# Patient Record
Sex: FEMALE | Race: OTHER | HISPANIC OR LATINO
[De-identification: names, ages, dates, MRNs, and addresses within clinical notes are randomized per-mention and may not be internally consistent; named-entity substitution may affect disease eponyms.]

---

## 2018-02-08 ENCOUNTER — FORM ENCOUNTER (OUTPATIENT)
Age: 68
End: 2018-02-08

## 2018-02-09 ENCOUNTER — APPOINTMENT (OUTPATIENT)
Dept: RADIOLOGY | Facility: CLINIC | Age: 68
End: 2018-02-09

## 2018-02-09 ENCOUNTER — APPOINTMENT (OUTPATIENT)
Dept: ORTHOPEDIC SURGERY | Facility: CLINIC | Age: 68
End: 2018-02-09
Payer: MEDICARE

## 2018-02-09 ENCOUNTER — OUTPATIENT (OUTPATIENT)
Dept: OUTPATIENT SERVICES | Facility: HOSPITAL | Age: 68
LOS: 1 days | End: 2018-02-09
Payer: MEDICARE

## 2018-02-09 VITALS — RESPIRATION RATE: 16 BRPM | WEIGHT: 180 LBS | HEIGHT: 60 IN | BODY MASS INDEX: 35.34 KG/M2

## 2018-02-09 DIAGNOSIS — Z86.39 PERSONAL HISTORY OF OTHER ENDOCRINE, NUTRITIONAL AND METABOLIC DISEASE: ICD-10-CM

## 2018-02-09 DIAGNOSIS — M25.562 PAIN IN RIGHT KNEE: ICD-10-CM

## 2018-02-09 DIAGNOSIS — Z87.39 PERSONAL HISTORY OF OTHER DISEASES OF THE MUSCULOSKELETAL SYSTEM AND CONNECTIVE TISSUE: ICD-10-CM

## 2018-02-09 DIAGNOSIS — Z87.898 PERSONAL HISTORY OF OTHER SPECIFIED CONDITIONS: ICD-10-CM

## 2018-02-09 DIAGNOSIS — M25.561 PAIN IN RIGHT KNEE: ICD-10-CM

## 2018-02-09 DIAGNOSIS — Z80.9 FAMILY HISTORY OF MALIGNANT NEOPLASM, UNSPECIFIED: ICD-10-CM

## 2018-02-09 PROCEDURE — 20611 DRAIN/INJ JOINT/BURSA W/US: CPT | Mod: RT

## 2018-02-09 PROCEDURE — 73564 X-RAY EXAM KNEE 4 OR MORE: CPT | Mod: 26,50

## 2018-02-09 PROCEDURE — 73564 X-RAY EXAM KNEE 4 OR MORE: CPT

## 2018-02-09 PROCEDURE — 73560 X-RAY EXAM OF KNEE 1 OR 2: CPT | Mod: 26,50

## 2018-02-09 PROCEDURE — 73564 X-RAY EXAM KNEE 4 OR MORE: CPT | Mod: 50

## 2018-02-09 PROCEDURE — 99204 OFFICE O/P NEW MOD 45 MIN: CPT | Mod: 25

## 2018-02-09 PROCEDURE — 73560 X-RAY EXAM OF KNEE 1 OR 2: CPT

## 2018-02-16 ENCOUNTER — APPOINTMENT (OUTPATIENT)
Dept: ORTHOPEDIC SURGERY | Facility: CLINIC | Age: 68
End: 2018-02-16

## 2018-02-16 ENCOUNTER — APPOINTMENT (OUTPATIENT)
Dept: ORTHOPEDIC SURGERY | Facility: CLINIC | Age: 68
End: 2018-02-16
Payer: MEDICARE

## 2018-02-16 PROCEDURE — 20610 DRAIN/INJ JOINT/BURSA W/O US: CPT | Mod: LT

## 2018-02-16 PROCEDURE — 99214 OFFICE O/P EST MOD 30 MIN: CPT | Mod: 25

## 2018-02-19 ENCOUNTER — TRANSCRIPTION ENCOUNTER (OUTPATIENT)
Age: 68
End: 2018-02-19

## 2021-11-22 ENCOUNTER — OUTPATIENT (OUTPATIENT)
Dept: OUTPATIENT SERVICES | Facility: HOSPITAL | Age: 71
LOS: 1 days | End: 2021-11-22
Payer: MEDICARE

## 2021-11-22 ENCOUNTER — RESULT REVIEW (OUTPATIENT)
Age: 71
End: 2021-11-22

## 2021-11-22 ENCOUNTER — APPOINTMENT (OUTPATIENT)
Dept: ORTHOPEDIC SURGERY | Facility: CLINIC | Age: 71
End: 2021-11-22
Payer: MEDICARE

## 2021-11-22 VITALS — WEIGHT: 180 LBS | HEIGHT: 60 IN | RESPIRATION RATE: 16 BRPM | BODY MASS INDEX: 35.34 KG/M2

## 2021-11-22 PROCEDURE — 73564 X-RAY EXAM KNEE 4 OR MORE: CPT | Mod: 26,LT,RT

## 2021-11-22 PROCEDURE — 99203 OFFICE O/P NEW LOW 30 MIN: CPT

## 2021-11-22 PROCEDURE — 73564 X-RAY EXAM KNEE 4 OR MORE: CPT

## 2021-12-08 ENCOUNTER — APPOINTMENT (OUTPATIENT)
Dept: ORTHOPEDIC SURGERY | Facility: CLINIC | Age: 71
End: 2021-12-08
Payer: MEDICARE

## 2021-12-08 VITALS — DIASTOLIC BLOOD PRESSURE: 76 MMHG | HEART RATE: 84 BPM | SYSTOLIC BLOOD PRESSURE: 134 MMHG

## 2021-12-08 VITALS — HEIGHT: 60 IN | WEIGHT: 180 LBS | BODY MASS INDEX: 35.34 KG/M2

## 2021-12-08 PROCEDURE — 99214 OFFICE O/P EST MOD 30 MIN: CPT

## 2021-12-08 NOTE — DISCUSSION/SUMMARY
[de-identified] : 72y/o female with bilateral knee osteoarthritis, left consistently more symptomatic than right\par - She is indicated for staged bilateral total knee replacement starting with the left side first\par - We discussed the details of the procedure, the expected recovery period, and the expected outcome. We discussed the likelihood of satisfaction after complete recovery, and the potential causes of dissatisfaction. The importance of active patient participation in the rehabilitation protocol was emphasized, along with its influence on short and long-term outcomes. Specific risks of total knee replacement were discussed in detail. We discussed the risk of surgical site complications including but not limited to: surgical site infection, wound healing complications, bone fracture, tendon or ligament injury, neurovascular injury, hemorrhage, postoperative stiffness or instability, persistent pain and need for reoperation or manipulation under anesthesia. We discussed surgical blood loss and the possible need for blood transfusion. We discussed the risk of perioperative medical complications, including but not limited to catheter-associated urinary tract infection, venous thromboembolism and other cardiopulmonary complications. We discussed anesthetic options and the risk of anesthesia-related complications. We discussed implant fixation methods; my plan would be to use fully cemented fixation in this case. We discussed the variable need to resurface the patella; my plan would be to leave unresurfaced in this case. We discussed the durability of prosthetic knees and limitations related to wear, osteolysis and loosening.  We discussed the role of the robot in helping to guide bone resections and measure alignment and soft tissue balance. All questions were answered the patient's satisfaction. The patient was given a copy of my preoperative packet with additional information about the procedure. I asked the patient to either call back or schedule a followup appointment for any additional questions or concerns regarding the procedure.\par - Book for surgery at a convenient time, ASAP per their preference\par - Standard medical clearance preop\par - Discussed with daughter present who also had all questions answered to her satisfaction\par - Primary point of contact should be son Tono

## 2021-12-08 NOTE — PHYSICAL EXAM
[de-identified] : General appearance: well nourished and hydrated, pleasant, alert and oriented x 3, cooperative.  \par HEENT: normocephalic, EOM intact, wearing mask, external auditory canal clear.  \par Cardiovascular: no lower leg edema, no varicosities, dorsalis pedis pulses palpable and symmetric.  \par Lymphatics: no palpable lymphadenopathy, no lymphedema.  \par Neurologic: sensation is normal, no muscle weakness in upper or lower extremities, patella tendon reflexes present and symmetric.  \par Dermatologic: skin moist, warm, no rash.  \par Spine: cervical spine with normal lordosis and painless range of motion, thoracic spine with normal kyphosis and painless range of motion, lumbosacral spine with normal lordosis and painless range of motion. \par Gait: slow to stand and get started. Bilaterally antalgic with bilateral varus thrust.\par \par Left knee:\par - Soft tissue swelling: mild\par - Ecchymosis: none\par - Erythema: none\par - Effusion: small\par - Wounds: none\par - Alignment: partially correctable varus\par - Tenderness: circumferential\par - ROM: 0-125\par - Collateral laxity: none\par - Cruciate laxity: none\par - Popliteal angle (degrees): 30\par - Quad strength: 5/5\par \par Right knee:\par - Soft tissue swelling: mild\par - Ecchymosis: none\par - Erythema: none\par - Effusion: small\par - Wounds: none\par - Alignment: minimally correctable varus\par - Tenderness: circumferential\par - ROM: 0-125\par - Collateral laxity: none\par - Cruciate laxity: none\par - Popliteal angle (degrees): 30\par - Quad strength: 5/5 [de-identified] : 4 views of the bilateral knees (weightbearing AP, weightbearing Dawson, weightbearing lateral, and Sunrise) were interpreted by me and reviewed with the patient.\par \par Location of imaging: [facility]\par Date of exam: 11/22/21\par \par Right knee --\par Alignment: varus with lateral tibial subluxation\par Arthritis: severe tricompartmental worst medial, KL4\par Patellar height: normal\par Patellar tracking: central\par \par Left knee --\par Alignment: varus with lateral tibial subluxation\par Arthritis: severe tricompartmental worst medial, KL4\par Patellar height: normal\par Patellar tracking: central

## 2021-12-08 NOTE — HISTORY OF PRESENT ILLNESS
[9] : a current pain level of 9/10 [Walking] : worsened by walking [Rest] : relieved by rest [de-identified] : 12/8/21: 72y/o Bangladeshi speaking female here with daughter Conchis, ref by Dr. Bunch for evaluation of bilateral knee osteoarthritis. Left is consistently more symptomatic than the right. Progressive pain, swelling, deformity, and loss of function for the past 6 years. Treatments attempted thus far include PT, HEP, activity modification, Tylenol, various NSAIDs, CSI, and PRP. Pain at this point is severe every day and limits her from ambulating any more than necessary for basic necessities. Lives with an adult child; has three steps to manage at home. Can walk about 5-10min with cane. The child does all the IADLs. Here to discuss TKA.\par \par PMH sig only for HLD.  [de-identified] : describes daily pa

## 2022-01-07 ENCOUNTER — NON-APPOINTMENT (OUTPATIENT)
Age: 72
End: 2022-01-07

## 2022-01-27 ENCOUNTER — TRANSCRIPTION ENCOUNTER (OUTPATIENT)
Age: 72
End: 2022-01-27

## 2022-01-27 VITALS
OXYGEN SATURATION: 97 % | SYSTOLIC BLOOD PRESSURE: 127 MMHG | HEIGHT: 58 IN | TEMPERATURE: 97 F | RESPIRATION RATE: 16 BRPM | HEART RATE: 70 BPM | DIASTOLIC BLOOD PRESSURE: 77 MMHG | WEIGHT: 198.64 LBS

## 2022-01-27 RX ORDER — POVIDONE-IODINE 5 %
1 AEROSOL (ML) TOPICAL ONCE
Refills: 0 | Status: COMPLETED | OUTPATIENT
Start: 2022-01-28 | End: 2022-01-28

## 2022-01-27 NOTE — PATIENT PROFILE ADULT - FALL HARM RISK - UNIVERSAL INTERVENTIONS
Bed in lowest position, wheels locked, appropriate side rails in place/Call bell, personal items and telephone in reach/Instruct patient to call for assistance before getting out of bed or chair/Non-slip footwear when patient is out of bed/Ava to call system/Physically safe environment - no spills, clutter or unnecessary equipment/Purposeful Proactive Rounding/Room/bathroom lighting operational, light cord in reach

## 2022-01-27 NOTE — PRE-OP CHECKLIST - PATIENT SENT TO
Losartan 100 mg refill request pending doctors approval.    Last office visit 8/9/18 with Dr Mancuso    Next office visit 9/13/19 with Dr Jefe Rodriguez to establish care     operating room

## 2022-01-27 NOTE — H&P ADULT - HISTORY OF PRESENT ILLNESS
71y of c/o left knee pain x   Presents today for elective LEFT TKR, ROBOTIC ASSISTED.  71F c/o left knee pain x 6 years. Pt denies preceding trauma/injury. She states her left knee pain is localized; she denies numbness/tingling/weakness of bilateral lower extremities. She has been using a cane for ambulation x 2 years. Denies DVT hx; denies CP, SOB, N/V, tactile fevers, calf pain.  Presents today for elective left total knee replacement, robotic assisted     English Translation: Jose Elias, ID: [id number]

## 2022-01-27 NOTE — H&P ADULT - PROBLEM SELECTOR PLAN 1
Admit to Orthopaedic Service.  Presents today for elective LEFT TKR, ROBOTIC ASSISTED.  Pt medically stable and cleared for procedure today by Dr. Jimenez. Admit to Orthopaedic Service.  Presents today for elective left total knee replacement, robotic assisted   Pt medically stable and cleared for procedure today by Dr. Jimenez.

## 2022-01-27 NOTE — H&P ADULT - NSHPPHYSICALEXAM_GEN_ALL_CORE
PE:  Decreased ROM secondary to pain. Rest of PE per medical clearance. MSK: + decreased ROM 2/2 pain, left knee  Skin warm and well perfused, no visible wounds/erythema/ecchymoses  EHL/FHL/TA/GS 5/5 motor strength bilateral lower extremities   SLT in tact to distal bilateral lower extremities   DP pulses palpable bilaterally  Remainder of exam per medical clearance note

## 2022-01-28 ENCOUNTER — APPOINTMENT (OUTPATIENT)
Dept: ORTHOPEDIC SURGERY | Facility: HOSPITAL | Age: 72
End: 2022-01-28

## 2022-01-28 ENCOUNTER — INPATIENT (INPATIENT)
Facility: HOSPITAL | Age: 72
LOS: 2 days | Discharge: HOME HEALTH | DRG: 470 | End: 2022-01-31
Attending: ORTHOPAEDIC SURGERY | Admitting: ORTHOPAEDIC SURGERY
Payer: MEDICARE

## 2022-01-28 ENCOUNTER — TRANSCRIPTION ENCOUNTER (OUTPATIENT)
Age: 72
End: 2022-01-28

## 2022-01-28 DIAGNOSIS — M19.90 UNSPECIFIED OSTEOARTHRITIS, UNSPECIFIED SITE: ICD-10-CM

## 2022-01-28 DIAGNOSIS — Z98.891 HISTORY OF UTERINE SCAR FROM PREVIOUS SURGERY: Chronic | ICD-10-CM

## 2022-01-28 PROCEDURE — 27447 TOTAL KNEE ARTHROPLASTY: CPT | Mod: LT

## 2022-01-28 PROCEDURE — S2900 ROBOTIC SURGICAL SYSTEM: CPT | Mod: NC

## 2022-01-28 PROCEDURE — 73560 X-RAY EXAM OF KNEE 1 OR 2: CPT | Mod: 26,LT

## 2022-01-28 DEVICE — ZIMMER FEMALE HEX SCREW MAGNETIC 2.5MM X 25MM: Type: IMPLANTABLE DEVICE | Status: FUNCTIONAL

## 2022-01-28 DEVICE — IMPLANTABLE DEVICE: Type: IMPLANTABLE DEVICE | Status: FUNCTIONAL

## 2022-01-28 DEVICE — PIN CAS FIX 3.2X150MM: Type: IMPLANTABLE DEVICE | Status: FUNCTIONAL

## 2022-01-28 DEVICE — STEM EXT PERSONA 14MM PLUS 30M: Type: IMPLANTABLE DEVICE | Status: FUNCTIONAL

## 2022-01-28 DEVICE — CEMENT SIMPLEX HV: Type: IMPLANTABLE DEVICE | Status: FUNCTIONAL

## 2022-01-28 DEVICE — PATELLA POLY VE 26MM: Type: IMPLANTABLE DEVICE | Status: FUNCTIONAL

## 2022-01-28 DEVICE — ZIMMER/NEXGEN SMOOTH PIN 3.2X75MM: Type: IMPLANTABLE DEVICE | Status: FUNCTIONAL

## 2022-01-28 DEVICE — STEM PSN TIB CMT SZ CL 5 DEG: Type: IMPLANTABLE DEVICE | Status: FUNCTIONAL

## 2022-01-28 DEVICE — PIN FIX CAS 3.2X80MM STR: Type: IMPLANTABLE DEVICE | Status: FUNCTIONAL

## 2022-01-28 DEVICE — ZIMMER/NEXGEN HEX HEAD SCREW 3.5MM: Type: IMPLANTABLE DEVICE | Status: FUNCTIONAL

## 2022-01-28 RX ORDER — KETOROLAC TROMETHAMINE 30 MG/ML
15 SYRINGE (ML) INJECTION EVERY 6 HOURS
Refills: 0 | Status: DISCONTINUED | OUTPATIENT
Start: 2022-01-28 | End: 2022-01-29

## 2022-01-28 RX ORDER — OXYCODONE HYDROCHLORIDE 5 MG/1
10 TABLET ORAL EVERY 4 HOURS
Refills: 0 | Status: DISCONTINUED | OUTPATIENT
Start: 2022-01-28 | End: 2022-01-31

## 2022-01-28 RX ORDER — HYDROMORPHONE HYDROCHLORIDE 2 MG/ML
0.5 INJECTION INTRAMUSCULAR; INTRAVENOUS; SUBCUTANEOUS
Refills: 0 | Status: DISCONTINUED | OUTPATIENT
Start: 2022-01-28 | End: 2022-01-31

## 2022-01-28 RX ORDER — ASPIRIN/CALCIUM CARB/MAGNESIUM 324 MG
81 TABLET ORAL
Refills: 0 | Status: DISCONTINUED | OUTPATIENT
Start: 2022-01-29 | End: 2022-01-30

## 2022-01-28 RX ORDER — BUPIVACAINE 13.3 MG/ML
20 INJECTION, SUSPENSION, LIPOSOMAL INFILTRATION ONCE
Refills: 0 | Status: DISCONTINUED | OUTPATIENT
Start: 2022-01-28 | End: 2022-01-31

## 2022-01-28 RX ORDER — ACETAMINOPHEN 500 MG
1000 TABLET ORAL ONCE
Refills: 0 | Status: COMPLETED | OUTPATIENT
Start: 2022-01-28 | End: 2022-01-28

## 2022-01-28 RX ORDER — MAGNESIUM HYDROXIDE 400 MG/1
30 TABLET, CHEWABLE ORAL DAILY
Refills: 0 | Status: DISCONTINUED | OUTPATIENT
Start: 2022-01-28 | End: 2022-01-31

## 2022-01-28 RX ORDER — ONDANSETRON 8 MG/1
4 TABLET, FILM COATED ORAL EVERY 6 HOURS
Refills: 0 | Status: DISCONTINUED | OUTPATIENT
Start: 2022-01-28 | End: 2022-01-31

## 2022-01-28 RX ORDER — POLYETHYLENE GLYCOL 3350 17 G/17G
17 POWDER, FOR SOLUTION ORAL AT BEDTIME
Refills: 0 | Status: DISCONTINUED | OUTPATIENT
Start: 2022-01-28 | End: 2022-01-31

## 2022-01-28 RX ORDER — FOLIC ACID 0.8 MG
1 TABLET ORAL DAILY
Refills: 0 | Status: DISCONTINUED | OUTPATIENT
Start: 2022-01-28 | End: 2022-01-31

## 2022-01-28 RX ORDER — APREPITANT 80 MG/1
40 CAPSULE ORAL ONCE
Refills: 0 | Status: COMPLETED | OUTPATIENT
Start: 2022-01-28 | End: 2022-01-28

## 2022-01-28 RX ORDER — PANTOPRAZOLE SODIUM 20 MG/1
40 TABLET, DELAYED RELEASE ORAL
Refills: 0 | Status: DISCONTINUED | OUTPATIENT
Start: 2022-01-28 | End: 2022-01-29

## 2022-01-28 RX ORDER — CELECOXIB 200 MG/1
200 CAPSULE ORAL EVERY 12 HOURS
Refills: 0 | Status: DISCONTINUED | OUTPATIENT
Start: 2022-01-29 | End: 2022-01-31

## 2022-01-28 RX ORDER — CEFAZOLIN SODIUM 1 G
2000 VIAL (EA) INJECTION EVERY 8 HOURS
Refills: 0 | Status: COMPLETED | OUTPATIENT
Start: 2022-01-28 | End: 2022-01-28

## 2022-01-28 RX ORDER — CELECOXIB 200 MG/1
400 CAPSULE ORAL ONCE
Refills: 0 | Status: COMPLETED | OUTPATIENT
Start: 2022-01-28 | End: 2022-01-28

## 2022-01-28 RX ORDER — ACETAMINOPHEN 500 MG
975 TABLET ORAL EVERY 8 HOURS
Refills: 0 | Status: DISCONTINUED | OUTPATIENT
Start: 2022-01-28 | End: 2022-01-31

## 2022-01-28 RX ORDER — CHLORHEXIDINE GLUCONATE 213 G/1000ML
1 SOLUTION TOPICAL ONCE
Refills: 0 | Status: COMPLETED | OUTPATIENT
Start: 2022-01-28 | End: 2022-01-28

## 2022-01-28 RX ORDER — HYDROMORPHONE HYDROCHLORIDE 2 MG/ML
1 INJECTION INTRAMUSCULAR; INTRAVENOUS; SUBCUTANEOUS EVERY 4 HOURS
Refills: 0 | Status: DISCONTINUED | OUTPATIENT
Start: 2022-01-28 | End: 2022-01-31

## 2022-01-28 RX ORDER — SENNA PLUS 8.6 MG/1
2 TABLET ORAL AT BEDTIME
Refills: 0 | Status: DISCONTINUED | OUTPATIENT
Start: 2022-01-28 | End: 2022-01-31

## 2022-01-28 RX ORDER — SODIUM CHLORIDE 9 MG/ML
1000 INJECTION, SOLUTION INTRAVENOUS
Refills: 0 | Status: DISCONTINUED | OUTPATIENT
Start: 2022-01-29 | End: 2022-01-31

## 2022-01-28 RX ORDER — BENZOCAINE AND MENTHOL 5; 1 G/100ML; G/100ML
1 LIQUID ORAL THREE TIMES A DAY
Refills: 0 | Status: DISCONTINUED | OUTPATIENT
Start: 2022-01-28 | End: 2022-01-31

## 2022-01-28 RX ORDER — OXYCODONE HYDROCHLORIDE 5 MG/1
5 TABLET ORAL EVERY 4 HOURS
Refills: 0 | Status: DISCONTINUED | OUTPATIENT
Start: 2022-01-28 | End: 2022-01-31

## 2022-01-28 RX ADMIN — Medication 975 MILLIGRAM(S): at 21:28

## 2022-01-28 RX ADMIN — HYDROMORPHONE HYDROCHLORIDE 0.5 MILLIGRAM(S): 2 INJECTION INTRAMUSCULAR; INTRAVENOUS; SUBCUTANEOUS at 12:03

## 2022-01-28 RX ADMIN — BENZOCAINE AND MENTHOL 1 LOZENGE: 5; 1 LIQUID ORAL at 16:36

## 2022-01-28 RX ADMIN — SENNA PLUS 2 TABLET(S): 8.6 TABLET ORAL at 21:28

## 2022-01-28 RX ADMIN — Medication 975 MILLIGRAM(S): at 22:00

## 2022-01-28 RX ADMIN — CELECOXIB 400 MILLIGRAM(S): 200 CAPSULE ORAL at 06:49

## 2022-01-28 RX ADMIN — HYDROMORPHONE HYDROCHLORIDE 0.5 MILLIGRAM(S): 2 INJECTION INTRAMUSCULAR; INTRAVENOUS; SUBCUTANEOUS at 11:57

## 2022-01-28 RX ADMIN — OXYCODONE HYDROCHLORIDE 10 MILLIGRAM(S): 5 TABLET ORAL at 16:20

## 2022-01-28 RX ADMIN — Medication 15 MILLIGRAM(S): at 23:31

## 2022-01-28 RX ADMIN — OXYCODONE HYDROCHLORIDE 10 MILLIGRAM(S): 5 TABLET ORAL at 15:45

## 2022-01-28 RX ADMIN — Medication 100 MILLIGRAM(S): at 16:58

## 2022-01-28 RX ADMIN — CHLORHEXIDINE GLUCONATE 1 APPLICATION(S): 213 SOLUTION TOPICAL at 06:48

## 2022-01-28 RX ADMIN — BENZOCAINE AND MENTHOL 1 LOZENGE: 5; 1 LIQUID ORAL at 21:28

## 2022-01-28 RX ADMIN — APREPITANT 40 MILLIGRAM(S): 80 CAPSULE ORAL at 06:53

## 2022-01-28 RX ADMIN — Medication 975 MILLIGRAM(S): at 15:49

## 2022-01-28 RX ADMIN — Medication 100 MILLIGRAM(S): at 23:31

## 2022-01-28 RX ADMIN — Medication 15 MILLIGRAM(S): at 17:35

## 2022-01-28 RX ADMIN — Medication 1 APPLICATION(S): at 06:53

## 2022-01-28 RX ADMIN — Medication 1000 MILLIGRAM(S): at 06:49

## 2022-01-28 RX ADMIN — Medication 975 MILLIGRAM(S): at 15:14

## 2022-01-28 RX ADMIN — Medication 15 MILLIGRAM(S): at 17:01

## 2022-01-28 RX ADMIN — HYDROMORPHONE HYDROCHLORIDE 0.5 MILLIGRAM(S): 2 INJECTION INTRAMUSCULAR; INTRAVENOUS; SUBCUTANEOUS at 11:39

## 2022-01-28 NOTE — DISCHARGE NOTE PROVIDER - CARE PROVIDER_API CALL
Robbin Koehler)  Orthopedics  [address], [address] [facility], [place] [address]  Phone: [phone]  Fax: [phone]  Follow Up Time:

## 2022-01-28 NOTE — DISCHARGE NOTE PROVIDER - NSDCCPTREATMENT_GEN_ALL_CORE_FT
PRINCIPAL PROCEDURE  Procedure: Total knee replacement  Findings and Treatment: LEFT TKR       PRINCIPAL PROCEDURE  Procedure: Total knee replacement  Findings and Treatment: left osteoarthritis

## 2022-01-28 NOTE — DISCHARGE NOTE PROVIDER - NSDCCPCAREPLAN_GEN_ALL_CORE_FT
PRINCIPAL DISCHARGE DIAGNOSIS  Diagnosis: Osteoarthritis  Assessment and Plan of Treatment: knee       PRINCIPAL DISCHARGE DIAGNOSIS  Diagnosis: Osteoarthritis  Assessment and Plan of Treatment: Left total knee replacement

## 2022-01-28 NOTE — PROGRESS NOTE ADULT - SUBJECTIVE AND OBJECTIVE BOX
Orthopedics Post Op Check    Procedure: LEFT TKR   Surgeon: OH    Pt. states she has some pain in her left knee and also started to feel dizzy (was sitting up for a while) with nausea. Denies CP, SOB, N/V, numbness/tingling in b/l les.     Vital Signs Last 24 Hrs  T(C): 36.4 (28 Jan 2022 11:24), Max: 36.4 (28 Jan 2022 11:24)  T(F): 97.6 (28 Jan 2022 11:24), Max: 97.6 (28 Jan 2022 11:24)  HR: 64 (28 Jan 2022 12:24) (64 - 72)  BP: 141/67 (28 Jan 2022 12:24) (97/53 - 141/67)  BP(mean): 96 (28 Jan 2022 12:24) (69 - 96)  RR: 17 (28 Jan 2022 12:24) (14 - 17)  SpO2: 98% (28 Jan 2022 12:24) (97% - 99%)      Dressing C/D/I    Pulses: DP/PT 2+ B/L LES  SLT:  intact B/L LES  Motor:  EHL/FHL/TA/GS  5/5          Post op XR: prosthesis in place     A/P: 71yoFemale POD#0 s/p LEFT TKR  - Stable  - Pain Control  - DVT ppx: ASA  - Post op abx:ANCEF  - PT, WBS: WBAT B/L LES  - F/U AM Labs Orthopedics Post Op Check    Procedure: LEFT TKR   Surgeon: OH    Pt. states she has some pain in her left knee and also started to feel dizzy (was sitting up for a while) with nausea. Denies CP, SOB, N/V, numbness/tingling in b/l les.     Vital Signs Last 24 Hrs  T(C): 36.4 (28 Jan 2022 11:24), Max: 36.4 (28 Jan 2022 11:24)  T(F): 97.6 (28 Jan 2022 11:24), Max: 97.6 (28 Jan 2022 11:24)  HR: 64 (28 Jan 2022 12:24) (64 - 72)  BP: 141/67 (28 Jan 2022 12:24) (97/53 - 141/67)  BP(mean): 96 (28 Jan 2022 12:24) (69 - 96)  RR: 17 (28 Jan 2022 12:24) (14 - 17)  SpO2: 98% (28 Jan 2022 12:24) (97% - 99%)      Dressing C/D/I    Pulses: DP/PT 2+ B/L LES  SLT:  intact B/L LES  Motor:  EHL/FHL/TA/GS  5/5    Post op XR: prosthesis in place     A/P: 71yoFemale POD#0 s/p LEFT TKR  - Stable  - Pain Control  - DVT ppx: ASA  - Post op abx:ANCEF  - PT, WBS: WBAT B/L LES  - F/U AM Labs  - Nausea will give zofran prn. VSS.

## 2022-01-28 NOTE — DISCHARGE NOTE PROVIDER - NSDCACTIVITY_GEN_ALL_CORE
Follow Instructions Provided by your Surgical Team Walking - Indoors allowed/No heavy lifting/straining/Follow Instructions Provided by your Surgical Team

## 2022-01-28 NOTE — DISCHARGE NOTE NURSING/CASE MANAGEMENT/SOCIAL WORK - PATIENT PORTAL LINK FT
You can access the FollowMyHealth Patient Portal offered by [facility] by registering at the following website: [email or web address]. By joining [employer] [facility] [employer]’s FollowMyHealth portal, you will also be able to view your health information using other applications (apps) compatible with our system.

## 2022-01-28 NOTE — PACU DISCHARGE NOTE - COMMENTS
Patient signed out by anesthesia. Prior to moving, pt denied any pain. No N/V. Satting 96-97% on room air. Cryocuff on left knee, dilaudid 0.5 mg x2 given during PACU stay. Patient transported to room 846-01, report endorsed by Renée VEE

## 2022-01-28 NOTE — DISCHARGE NOTE PROVIDER - HOSPITAL COURSE
Admitted  Surgery LEFT TKR   Corrine-op Antibiotics  Pain control  DVT prophylaxis  OOB/Physical Therapy Admitted- 1-  Surgery LEFT TKR   Corrine-op Antibiotics  Pain control  DVT prophylaxis  OOB/Physical Therapy Admitted- 1-  Surgery LEFT TKR   Corrine-op Antibiotics  Pain control  DVT prophylaxis  OOB/Physical Therapy   Cardiology Consult  Echo cardiogram

## 2022-01-28 NOTE — PHYSICAL THERAPY INITIAL EVALUATION ADULT - ADDITIONAL COMMENTS
Pt states she lives alone in elevator building. Pt used SC for amb and was independent with ADL"s PTA.

## 2022-01-28 NOTE — DISCHARGE NOTE PROVIDER - NSDCMRMEDTOKEN_GEN_ALL_CORE_FT
Iron 100 Plus oral tablet: 1 tab(s) orally once a day  Tylenol 500 mg oral tablet: 2 tab(s) orally every 6 hours, As Needed   rolling walker:    acetaminophen 325 mg oral tablet: 3 tab(s) orally every 8 hours  aspirin 81 mg oral delayed release tablet: 1 tab(s) orally 2 times a day  celecoxib 200 mg oral capsule: 1 cap(s) orally every 12 hours  oxyCODONE 5 mg oral tablet: 1 tab(s) orally every 4 hours, As needed, Moderate Pain (4 - 6)  rolling walker:    acetaminophen 325 mg oral tablet: 3 tab(s) orally every 8 hours  aspirin 81 mg oral delayed release tablet: 1 tab(s) orally 2 times a day  celecoxib 200 mg oral capsule: 1 cap(s) orally every 12 hours  oxyCODONE 5 mg oral tablet: 1 tab(s) orally every 4 hours, As needed, Moderate Pain (4 - 6)  pantoprazole 40 mg oral delayed release tablet: 1 tab(s) orally once a day (before a meal)  rolling walker:    acetaminophen 325 mg oral tablet: 3 tab(s) orally every 8 hours  apixaban 5 mg oral tablet: 1 tab(s) orally 2 times a day  aspirin 81 mg oral delayed release tablet: 1 tab(s) orally 2 times a day  celecoxib 200 mg oral capsule: 1 cap(s) orally every 12 hours  oxyCODONE 5 mg oral tablet: 1 tab(s) orally every 4 hours, As needed, Moderate Pain (4 - 6)  pantoprazole 40 mg oral delayed release tablet: 1 tab(s) orally once a day (before a meal)  rolling walker:    acetaminophen 325 mg oral tablet: 3 tab(s) orally every 8 hours  apixaban 5 mg oral tablet: 1 tab(s) orally 2 times a day  celecoxib 200 mg oral capsule: 1 cap(s) orally every 12 hours  oxyCODONE 5 mg oral tablet: 1 tab(s) orally every 4 hours, As needed, Moderate Pain (4 - 6) MDD:6  pantoprazole 40 mg oral delayed release tablet: 1 tab(s) orally once a day (before a meal)  rolling walker:

## 2022-01-28 NOTE — PHYSICAL THERAPY INITIAL EVALUATION ADULT - RANGE OF MOTION EXAMINATION, REHAB EVAL
Except L knee ~70 deg dangling at EOB/bilateral upper extremity ROM was WFL (within functional limits)/bilateral lower extremity ROM was WFL (within functional limits)

## 2022-01-28 NOTE — PHYSICAL THERAPY INITIAL EVALUATION ADULT - PERTINENT HX OF CURRENT PROBLEM, REHAB EVAL
71F c/o left knee pain x 6 years. Pt denies preceding trauma/injury. She states her left knee pain is localized; she denies numbness/tingling/weakness of bilateral lower extremities. She has been using a cane for ambulation x 2 years.

## 2022-01-28 NOTE — PHYSICAL THERAPY INITIAL EVALUATION ADULT - GENERAL OBSERVATIONS, REHAB EVAL
Pt received semi supine in bed +IV line +dressing on L knee CDI. PT received consent to treat from NANDA Rae. Pt left as received with call bell in reach, VSS, and in NAD. PT to follow up.

## 2022-01-28 NOTE — DISCHARGE NOTE PROVIDER - NSDCFUADDINST_GEN_ALL_CORE_FT
See Dr. Koehler instruction sheet See Dr. Koehler discharge instruction sheet. Medications sent to vivo per Dr. Koehler.  See Dr. Koehler discharge instruction sheet. Medications sent to vivo per Dr. Koehler.     You have an episode of a fast heart rate called- afib with a rvr- you were seen by cardiology and an echo cardiogram was ordered.   You were started on a blood thinner eliquis 5mg by mouth twice a day, metoprolol succinate 25mg PO daily   Echo performed showing Preserved BIV function without valvular pathology  Follow up with Dr. Urias within 2 weeks of [phone]

## 2022-01-29 LAB
ANION GAP SERPL CALC-SCNC: 11 MMOL/L — SIGNIFICANT CHANGE UP (ref 5–17)
ANION GAP SERPL CALC-SCNC: 14 MMOL/L — SIGNIFICANT CHANGE UP (ref 5–17)
BUN SERPL-MCNC: 14 MG/DL — SIGNIFICANT CHANGE UP (ref 7–23)
BUN SERPL-MCNC: 20 MG/DL — SIGNIFICANT CHANGE UP (ref 7–23)
CALCIUM SERPL-MCNC: 9 MG/DL — SIGNIFICANT CHANGE UP (ref 8.4–10.5)
CALCIUM SERPL-MCNC: 9.8 MG/DL — SIGNIFICANT CHANGE UP (ref 8.4–10.5)
CHLORIDE SERPL-SCNC: 104 MMOL/L — SIGNIFICANT CHANGE UP (ref 96–108)
CHLORIDE SERPL-SCNC: 99 MMOL/L — SIGNIFICANT CHANGE UP (ref 96–108)
CK MB CFR SERPL CALC: 5 NG/ML — SIGNIFICANT CHANGE UP (ref 0–6.7)
CK SERPL-CCNC: 263 U/L — HIGH (ref 25–170)
CO2 SERPL-SCNC: 22 MMOL/L — SIGNIFICANT CHANGE UP (ref 22–31)
CO2 SERPL-SCNC: 23 MMOL/L — SIGNIFICANT CHANGE UP (ref 22–31)
CREAT SERPL-MCNC: 0.63 MG/DL — SIGNIFICANT CHANGE UP (ref 0.5–1.3)
CREAT SERPL-MCNC: 0.91 MG/DL — SIGNIFICANT CHANGE UP (ref 0.5–1.3)
GLUCOSE SERPL-MCNC: 115 MG/DL — HIGH (ref 70–99)
GLUCOSE SERPL-MCNC: 96 MG/DL — SIGNIFICANT CHANGE UP (ref 70–99)
HCT VFR BLD CALC: 36.5 % — SIGNIFICANT CHANGE UP (ref 34.5–45)
HCT VFR BLD CALC: 39.8 % — SIGNIFICANT CHANGE UP (ref 34.5–45)
HGB BLD-MCNC: 11.7 G/DL — SIGNIFICANT CHANGE UP (ref 11.5–15.5)
HGB BLD-MCNC: 12.8 G/DL — SIGNIFICANT CHANGE UP (ref 11.5–15.5)
MAGNESIUM SERPL-MCNC: 2.1 MG/DL — SIGNIFICANT CHANGE UP (ref 1.6–2.6)
MCHC RBC-ENTMCNC: 28.8 PG — SIGNIFICANT CHANGE UP (ref 27–34)
MCHC RBC-ENTMCNC: 29 PG — SIGNIFICANT CHANGE UP (ref 27–34)
MCHC RBC-ENTMCNC: 32.1 GM/DL — SIGNIFICANT CHANGE UP (ref 32–36)
MCHC RBC-ENTMCNC: 32.2 GM/DL — SIGNIFICANT CHANGE UP (ref 32–36)
MCV RBC AUTO: 89.4 FL — SIGNIFICANT CHANGE UP (ref 80–100)
MCV RBC AUTO: 90.6 FL — SIGNIFICANT CHANGE UP (ref 80–100)
NRBC # BLD: 0 /100 WBCS — SIGNIFICANT CHANGE UP (ref 0–0)
NRBC # BLD: 0 /100 WBCS — SIGNIFICANT CHANGE UP (ref 0–0)
PHOSPHATE SERPL-MCNC: 3 MG/DL — SIGNIFICANT CHANGE UP (ref 2.5–4.5)
PLATELET # BLD AUTO: 244 K/UL — SIGNIFICANT CHANGE UP (ref 150–400)
PLATELET # BLD AUTO: 294 K/UL — SIGNIFICANT CHANGE UP (ref 150–400)
POTASSIUM SERPL-MCNC: 4.9 MMOL/L — SIGNIFICANT CHANGE UP (ref 3.5–5.3)
POTASSIUM SERPL-MCNC: 5 MMOL/L — SIGNIFICANT CHANGE UP (ref 3.5–5.3)
POTASSIUM SERPL-SCNC: 4.9 MMOL/L — SIGNIFICANT CHANGE UP (ref 3.5–5.3)
POTASSIUM SERPL-SCNC: 5 MMOL/L — SIGNIFICANT CHANGE UP (ref 3.5–5.3)
RBC # BLD: 4.03 M/UL — SIGNIFICANT CHANGE UP (ref 3.8–5.2)
RBC # BLD: 4.45 M/UL — SIGNIFICANT CHANGE UP (ref 3.8–5.2)
RBC # FLD: 13.1 % — SIGNIFICANT CHANGE UP (ref 10.3–14.5)
RBC # FLD: 13.2 % — SIGNIFICANT CHANGE UP (ref 10.3–14.5)
SODIUM SERPL-SCNC: 136 MMOL/L — SIGNIFICANT CHANGE UP (ref 135–145)
SODIUM SERPL-SCNC: 137 MMOL/L — SIGNIFICANT CHANGE UP (ref 135–145)
TROPONIN T SERPL-MCNC: 0.01 NG/ML — SIGNIFICANT CHANGE UP (ref 0–0.01)
TSH SERPL-MCNC: 5.7 UIU/ML — HIGH (ref 0.27–4.2)
WBC # BLD: 11.62 K/UL — HIGH (ref 3.8–10.5)
WBC # BLD: 14.36 K/UL — HIGH (ref 3.8–10.5)
WBC # FLD AUTO: 11.62 K/UL — HIGH (ref 3.8–10.5)
WBC # FLD AUTO: 14.36 K/UL — HIGH (ref 3.8–10.5)

## 2022-01-29 PROCEDURE — 71045 X-RAY EXAM CHEST 1 VIEW: CPT | Mod: 26

## 2022-01-29 RX ORDER — PANTOPRAZOLE SODIUM 20 MG/1
1 TABLET, DELAYED RELEASE ORAL
Qty: 0 | Refills: 0 | DISCHARGE
Start: 2022-01-29

## 2022-01-29 RX ORDER — SIMETHICONE 80 MG/1
80 TABLET, CHEWABLE ORAL
Refills: 0 | Status: DISCONTINUED | OUTPATIENT
Start: 2022-01-29 | End: 2022-01-30

## 2022-01-29 RX ORDER — ACETAMINOPHEN 500 MG
2 TABLET ORAL
Qty: 0 | Refills: 0 | DISCHARGE

## 2022-01-29 RX ORDER — PANTOPRAZOLE SODIUM 20 MG/1
40 TABLET, DELAYED RELEASE ORAL ONCE
Refills: 0 | Status: COMPLETED | OUTPATIENT
Start: 2022-01-29 | End: 2022-01-29

## 2022-01-29 RX ORDER — ACETAMINOPHEN 500 MG
3 TABLET ORAL
Qty: 0 | Refills: 0 | DISCHARGE
Start: 2022-01-29

## 2022-01-29 RX ORDER — BENZOYL PEROXIDE MICRONIZED 5.8 %
1 TOWELETTE (EA) TOPICAL
Qty: 0 | Refills: 0 | DISCHARGE

## 2022-01-29 RX ORDER — METOPROLOL TARTRATE 50 MG
5 TABLET ORAL ONCE
Refills: 0 | Status: COMPLETED | OUTPATIENT
Start: 2022-01-29 | End: 2022-01-29

## 2022-01-29 RX ORDER — ASPIRIN/CALCIUM CARB/MAGNESIUM 324 MG
1 TABLET ORAL
Qty: 0 | Refills: 0 | DISCHARGE
Start: 2022-01-29

## 2022-01-29 RX ORDER — CELECOXIB 200 MG/1
1 CAPSULE ORAL
Qty: 0 | Refills: 0 | DISCHARGE
Start: 2022-01-29

## 2022-01-29 RX ORDER — OXYCODONE HYDROCHLORIDE 5 MG/1
1 TABLET ORAL
Qty: 0 | Refills: 0 | DISCHARGE
Start: 2022-01-29

## 2022-01-29 RX ORDER — METOPROLOL TARTRATE 50 MG
12.5 TABLET ORAL
Refills: 0 | Status: DISCONTINUED | OUTPATIENT
Start: 2022-01-29 | End: 2022-01-31

## 2022-01-29 RX ADMIN — CELECOXIB 200 MILLIGRAM(S): 200 CAPSULE ORAL at 06:00

## 2022-01-29 RX ADMIN — CELECOXIB 200 MILLIGRAM(S): 200 CAPSULE ORAL at 17:25

## 2022-01-29 RX ADMIN — Medication 975 MILLIGRAM(S): at 13:00

## 2022-01-29 RX ADMIN — OXYCODONE HYDROCHLORIDE 10 MILLIGRAM(S): 5 TABLET ORAL at 13:00

## 2022-01-29 RX ADMIN — CELECOXIB 200 MILLIGRAM(S): 200 CAPSULE ORAL at 05:28

## 2022-01-29 RX ADMIN — Medication 975 MILLIGRAM(S): at 05:28

## 2022-01-29 RX ADMIN — Medication 975 MILLIGRAM(S): at 21:14

## 2022-01-29 RX ADMIN — Medication 975 MILLIGRAM(S): at 22:00

## 2022-01-29 RX ADMIN — Medication 15 MILLIGRAM(S): at 05:29

## 2022-01-29 RX ADMIN — Medication 15 MILLIGRAM(S): at 00:00

## 2022-01-29 RX ADMIN — Medication 1 MILLIGRAM(S): at 12:59

## 2022-01-29 RX ADMIN — PANTOPRAZOLE SODIUM 40 MILLIGRAM(S): 20 TABLET, DELAYED RELEASE ORAL at 05:29

## 2022-01-29 RX ADMIN — BENZOCAINE AND MENTHOL 1 LOZENGE: 5; 1 LIQUID ORAL at 05:29

## 2022-01-29 RX ADMIN — Medication 1 TABLET(S): at 12:59

## 2022-01-29 RX ADMIN — OXYCODONE HYDROCHLORIDE 10 MILLIGRAM(S): 5 TABLET ORAL at 03:30

## 2022-01-29 RX ADMIN — Medication 15 MILLIGRAM(S): at 05:50

## 2022-01-29 RX ADMIN — Medication 81 MILLIGRAM(S): at 17:25

## 2022-01-29 RX ADMIN — Medication 30 MILLILITER(S): at 19:52

## 2022-01-29 RX ADMIN — BENZOCAINE AND MENTHOL 1 LOZENGE: 5; 1 LIQUID ORAL at 22:40

## 2022-01-29 RX ADMIN — Medication 15 MILLIGRAM(S): at 12:59

## 2022-01-29 RX ADMIN — Medication 15 MILLIGRAM(S): at 13:34

## 2022-01-29 RX ADMIN — Medication 975 MILLIGRAM(S): at 06:00

## 2022-01-29 RX ADMIN — PANTOPRAZOLE SODIUM 40 MILLIGRAM(S): 20 TABLET, DELAYED RELEASE ORAL at 19:52

## 2022-01-29 RX ADMIN — OXYCODONE HYDROCHLORIDE 10 MILLIGRAM(S): 5 TABLET ORAL at 02:45

## 2022-01-29 RX ADMIN — POLYETHYLENE GLYCOL 3350 17 GRAM(S): 17 POWDER, FOR SOLUTION ORAL at 21:14

## 2022-01-29 RX ADMIN — Medication 81 MILLIGRAM(S): at 05:28

## 2022-01-29 RX ADMIN — OXYCODONE HYDROCHLORIDE 10 MILLIGRAM(S): 5 TABLET ORAL at 13:30

## 2022-01-29 RX ADMIN — BENZOCAINE AND MENTHOL 1 LOZENGE: 5; 1 LIQUID ORAL at 13:00

## 2022-01-29 RX ADMIN — SIMETHICONE 80 MILLIGRAM(S): 80 TABLET, CHEWABLE ORAL at 22:04

## 2022-01-29 RX ADMIN — Medication 5 MILLIGRAM(S): at 19:05

## 2022-01-29 RX ADMIN — SENNA PLUS 2 TABLET(S): 8.6 TABLET ORAL at 21:14

## 2022-01-29 RX ADMIN — Medication 975 MILLIGRAM(S): at 13:34

## 2022-01-29 NOTE — CHART NOTE - NSCHARTNOTEFT_GEN_A_CORE
Was paged by REAL Rae Was paged by REAL Rae that pt hr elevated to 150s. Went to assess and pt. Vitals obtained demonstrated HR to 180s, SBP to 160s. Pt denied chest pain or SOB but did endorse gas pain and belching. Stat EKG was obtained that demonstrated pt to be in Afib RVR. Stat labs including CBC, BMP, TSH, Mg, Phos, cardiac enzymes (ckmb, creatine kinase, trops) were obtained, and a monitor was placed on the pt for real time vitals monitoring. Cards was consulted and recommended pushing lopressor 5 over 2.5 minutes. Lopressor 5 was subsequently given over 2.5 minutes and pts HR decreased to 90s and appeared to be back in NSR. EKG obtained after pushing lopressor revealed pt to be in NSR, HR 90s with SBP in 120s. Cardiology fellow assessed pt and agreed that pt was in stable state, and was in normal sinus rhythm. Cardiology fellow recommended starting pt on PO metoprolol tartrate 12.5 BID and to obtain a non urgent echo to assess EF. Pt was transferred from regional to [facility] for hemodynamic monitoring.

## 2022-01-29 NOTE — PROGRESS NOTE ADULT - SUBJECTIVE AND OBJECTIVE BOX
Ortho Note    Pt comfortable without complaints, pain controlled  Denies CP, SOB, N/V, numbness/tingling     Vital Signs Last 24 Hrs  T(C): 36.8 (01-29-22 @ 04:50), Max: 36.8 (01-29-22 @ 04:50)  T(F): 98.2 (01-29-22 @ 04:50), Max: 98.2 (01-29-22 @ 04:50)  HR: 70 (01-29-22 @ 04:50) (70 - 80)  BP: 100/65 (01-29-22 @ 04:50) (100/65 - 102/65)  BP(mean): --  RR: 16 (01-29-22 @ 04:50) (16 - 16)  SpO2: 96% (01-29-22 @ 04:50) (96% - 96%)  AVSS    General: Pt Alert and oriented, NAD  DSG C/D/I  Pulses: 2+ DP   Sensation: SILT dpn/spn/tibial/sural/saph b/l  Motor: 5/5 EHL/FHL/TA/GS b/l     A/P: 71yFemale s/p L TKA by Dr. Koehler, POD1  - Stable  - Pain Control  - DVT ppx: SCDs  - PT, WBS: WBAT, HPT pending pt clearance   - nausea control/bowel regimen  - c/w home meds  - Drains: none           Ortho Pager [phone]

## 2022-01-29 NOTE — CONSULT NOTE ADULT - ATTENDING COMMENTS
71 y. o Croatian-speaking Female with PMHx of obesity presented for a TKR by ortho. POD#0 s/p LEFT TKR.   Cardiology was consulted for new onset afib with RVR.  Patient has been in NSR.  Continue BB.  Check echo.  Start eliquis 5 BID (chadsvasc 3) when safe from a bleeding point of view from primary team.

## 2022-01-29 NOTE — CONSULT NOTE ADULT - SUBJECTIVE AND OBJECTIVE BOX
HPI:  71F c/o left knee pain x 6 years. Pt denies preceding trauma/injury. She states her left knee pain is localized; she denies numbness/tingling/weakness of bilateral lower extremities. She has been using a cane for ambulation x 2 years. Denies DVT hx; denies CP, SOB, N/V, tactile fevers, calf pain.  Presents today for elective left total knee replacement, robotic assisted     Bahamian Translation: Jose Elias, ID: [id number]  (2022 12:41)      ROS: A 10-point review of systems was otherwise negative.    PAST MEDICAL & SURGICAL HISTORY:  Osteoarthritis  knee    Osteopenia    Gastritis    H/O:       SOCIAL HISTORY:  FAMILY HISTORY:    ALLERGIES: 	  No Known Allergies          MEDICATIONS:  acetaminophen     Tablet .. 975 milliGRAM(s) Oral every 8 hours  aluminum hydroxide/magnesium hydroxide/simethicone Suspension 30 milliLiter(s) Oral every 4 hours PRN  aspirin enteric coated 81 milliGRAM(s) Oral two times a day  benzocaine 15 mG/menthol 3.6 mG Lozenge 1 Lozenge Oral three times a day  BUpivacaine liposome 1.3% Injectable (no eMAR) 20 milliLiter(s) Local Injection once  celecoxib 200 milliGRAM(s) Oral every 12 hours  folic acid 1 milliGRAM(s) Oral daily  HYDROmorphone  Injectable 1 milliGRAM(s) IV Push every 4 hours PRN  HYDROmorphone  Injectable 0.5 milliGRAM(s) IV Push every 15 minutes PRN  lactated ringers. 1000 milliLiter(s) IV Continuous <Continuous>  magnesium hydroxide Suspension 30 milliLiter(s) Oral daily PRN  metoprolol tartrate 12.5 milliGRAM(s) Oral two times a day  multivitamin 1 Tablet(s) Oral daily  ondansetron Injectable 4 milliGRAM(s) IV Push every 6 hours PRN  oxyCODONE    IR 5 milliGRAM(s) Oral every 4 hours PRN  oxyCODONE    IR 10 milliGRAM(s) Oral every 4 hours PRN  pantoprazole    Tablet 40 milliGRAM(s) Oral before breakfast  polyethylene glycol 3350 17 Gram(s) Oral at bedtime  senna 2 Tablet(s) Oral at bedtime      PHYSICAL EXAM:  T(C): 36.6 (22 @ 17:15), Max: 37.3 (22 @ 09:55)  HR: 157 (22 @ 18:45) (63 - 157)  BP: 133/86 (22 @ 18:45) (96/60 - 133/86)  RR: 18 (22 @ 18:45) (14 - 18)  SpO2: 97% (22 @ 18:45) (94% - 98%)  Wt(kg): --    GEN: Awake, comfortable. NAD, obese female  HEENT: NCAT  RESP: CTA b/l  CV: irregular, normal s1/s2. No m/r/g.  ABD: Soft, NTND  EXT: Warm. No edema, clubbing, or cyanosis. left knee dressing  NEURO: AAOx3. No focal deficits.    I&O's Summary    2022 07:  -  2022 07:00  --------------------------------------------------------  IN: 100 mL / OUT: 300 mL / NET: -200 mL    2022 07:  -  2022 20:10  --------------------------------------------------------  IN: 800 mL / OUT: 600 mL / NET: 200 mL        LABS:	 	                        12.8   14.36 )-----------( 294      ( 2022 18:59 )             39.8         136  |  99  |  20  ----------------------------<  96  4.9   |  23  |  0.91    Ca    9.8      2022 18:59  Phos  3.0       Mg     2.1           CARDIAC MARKERS ( 2022 18:59 )  x     / 0.01 ng/mL / 263 U/L / x     / 5.0 ng/mL

## 2022-01-30 LAB
ANION GAP SERPL CALC-SCNC: 9 MMOL/L — SIGNIFICANT CHANGE UP (ref 5–17)
BUN SERPL-MCNC: 20 MG/DL — SIGNIFICANT CHANGE UP (ref 7–23)
CALCIUM SERPL-MCNC: 9.1 MG/DL — SIGNIFICANT CHANGE UP (ref 8.4–10.5)
CHLORIDE SERPL-SCNC: 106 MMOL/L — SIGNIFICANT CHANGE UP (ref 96–108)
CO2 SERPL-SCNC: 24 MMOL/L — SIGNIFICANT CHANGE UP (ref 22–31)
CREAT SERPL-MCNC: 0.67 MG/DL — SIGNIFICANT CHANGE UP (ref 0.5–1.3)
GLUCOSE SERPL-MCNC: 112 MG/DL — HIGH (ref 70–99)
HCT VFR BLD CALC: 36.2 % — SIGNIFICANT CHANGE UP (ref 34.5–45)
HGB BLD-MCNC: 11.7 G/DL — SIGNIFICANT CHANGE UP (ref 11.5–15.5)
MCHC RBC-ENTMCNC: 29.4 PG — SIGNIFICANT CHANGE UP (ref 27–34)
MCHC RBC-ENTMCNC: 32.3 GM/DL — SIGNIFICANT CHANGE UP (ref 32–36)
MCV RBC AUTO: 91 FL — SIGNIFICANT CHANGE UP (ref 80–100)
NRBC # BLD: 0 /100 WBCS — SIGNIFICANT CHANGE UP (ref 0–0)
PLATELET # BLD AUTO: 246 K/UL — SIGNIFICANT CHANGE UP (ref 150–400)
POTASSIUM SERPL-MCNC: 4.2 MMOL/L — SIGNIFICANT CHANGE UP (ref 3.5–5.3)
POTASSIUM SERPL-SCNC: 4.2 MMOL/L — SIGNIFICANT CHANGE UP (ref 3.5–5.3)
RBC # BLD: 3.98 M/UL — SIGNIFICANT CHANGE UP (ref 3.8–5.2)
RBC # FLD: 13.2 % — SIGNIFICANT CHANGE UP (ref 10.3–14.5)
SODIUM SERPL-SCNC: 139 MMOL/L — SIGNIFICANT CHANGE UP (ref 135–145)
WBC # BLD: 8.69 K/UL — SIGNIFICANT CHANGE UP (ref 3.8–10.5)
WBC # FLD AUTO: 8.69 K/UL — SIGNIFICANT CHANGE UP (ref 3.8–10.5)

## 2022-01-30 PROCEDURE — 99222 1ST HOSP IP/OBS MODERATE 55: CPT

## 2022-01-30 RX ORDER — CALCIUM CARBONATE 500(1250)
2 TABLET ORAL EVERY 6 HOURS
Refills: 0 | Status: DISCONTINUED | OUTPATIENT
Start: 2022-01-30 | End: 2022-01-31

## 2022-01-30 RX ORDER — APIXABAN 2.5 MG/1
5 TABLET, FILM COATED ORAL
Refills: 0 | Status: DISCONTINUED | OUTPATIENT
Start: 2022-01-30 | End: 2022-01-30

## 2022-01-30 RX ORDER — SIMETHICONE 80 MG/1
80 TABLET, CHEWABLE ORAL EVERY 6 HOURS
Refills: 0 | Status: DISCONTINUED | OUTPATIENT
Start: 2022-01-30 | End: 2022-01-31

## 2022-01-30 RX ORDER — APIXABAN 2.5 MG/1
5 TABLET, FILM COATED ORAL
Refills: 0 | Status: DISCONTINUED | OUTPATIENT
Start: 2022-01-30 | End: 2022-01-31

## 2022-01-30 RX ADMIN — Medication 975 MILLIGRAM(S): at 05:29

## 2022-01-30 RX ADMIN — BENZOCAINE AND MENTHOL 1 LOZENGE: 5; 1 LIQUID ORAL at 21:24

## 2022-01-30 RX ADMIN — Medication 1 TABLET(S): at 11:38

## 2022-01-30 RX ADMIN — SENNA PLUS 2 TABLET(S): 8.6 TABLET ORAL at 21:24

## 2022-01-30 RX ADMIN — Medication 975 MILLIGRAM(S): at 06:20

## 2022-01-30 RX ADMIN — Medication 975 MILLIGRAM(S): at 22:20

## 2022-01-30 RX ADMIN — OXYCODONE HYDROCHLORIDE 10 MILLIGRAM(S): 5 TABLET ORAL at 00:10

## 2022-01-30 RX ADMIN — Medication 81 MILLIGRAM(S): at 05:29

## 2022-01-30 RX ADMIN — CELECOXIB 200 MILLIGRAM(S): 200 CAPSULE ORAL at 06:20

## 2022-01-30 RX ADMIN — Medication 975 MILLIGRAM(S): at 13:43

## 2022-01-30 RX ADMIN — CELECOXIB 200 MILLIGRAM(S): 200 CAPSULE ORAL at 05:29

## 2022-01-30 RX ADMIN — SIMETHICONE 80 MILLIGRAM(S): 80 TABLET, CHEWABLE ORAL at 17:06

## 2022-01-30 RX ADMIN — SIMETHICONE 80 MILLIGRAM(S): 80 TABLET, CHEWABLE ORAL at 13:55

## 2022-01-30 RX ADMIN — APIXABAN 5 MILLIGRAM(S): 2.5 TABLET, FILM COATED ORAL at 18:12

## 2022-01-30 RX ADMIN — APIXABAN 5 MILLIGRAM(S): 2.5 TABLET, FILM COATED ORAL at 10:55

## 2022-01-30 RX ADMIN — Medication 975 MILLIGRAM(S): at 14:39

## 2022-01-30 RX ADMIN — Medication 12.5 MILLIGRAM(S): at 05:30

## 2022-01-30 RX ADMIN — CELECOXIB 200 MILLIGRAM(S): 200 CAPSULE ORAL at 17:29

## 2022-01-30 RX ADMIN — BENZOCAINE AND MENTHOL 1 LOZENGE: 5; 1 LIQUID ORAL at 13:43

## 2022-01-30 RX ADMIN — BENZOCAINE AND MENTHOL 1 LOZENGE: 5; 1 LIQUID ORAL at 05:30

## 2022-01-30 RX ADMIN — Medication 12.5 MILLIGRAM(S): at 17:29

## 2022-01-30 RX ADMIN — Medication 975 MILLIGRAM(S): at 21:24

## 2022-01-30 RX ADMIN — SIMETHICONE 80 MILLIGRAM(S): 80 TABLET, CHEWABLE ORAL at 10:07

## 2022-01-30 RX ADMIN — OXYCODONE HYDROCHLORIDE 10 MILLIGRAM(S): 5 TABLET ORAL at 01:00

## 2022-01-30 RX ADMIN — Medication 1 MILLIGRAM(S): at 11:38

## 2022-01-30 RX ADMIN — CELECOXIB 200 MILLIGRAM(S): 200 CAPSULE ORAL at 18:15

## 2022-01-30 NOTE — PROGRESS NOTE ADULT - SUBJECTIVE AND OBJECTIVE BOX
Ortho Note    Pt comfortable, pain controlled  Denies CP, SOB, N/V, numbness/tingling   Patient CV stable this AM after episode of Afib w RVR 01/29 resolved w Lopressor x 1 dose  Cards consulted 01/29    Vital Signs Last 24 Hrs  T(C): 36.7 (30 Jan 2022 08:35), Max: 37.3 (29 Jan 2022 09:55)  T(F): 98.1 (30 Jan 2022 08:35), Max: 99.1 (29 Jan 2022 09:55)  HR: 67 (30 Jan 2022 08:35) (63 - 157)  BP: 126/67 (30 Jan 2022 08:35) (96/60 - 171/101)  BP(mean): --  RR: 18 (30 Jan 2022 08:35) (14 - 18)  SpO2: 100% (30 Jan 2022 08:35) (94% - 100%)      VSS this AM  General: Slovenian speaking, A&Ox3, NAD  LLE: Aquacell DSG C/D/I  Pulses: Foot WWP; DP pulse 2+; Cap refill < 2 sec  Sensation: SILT distally and symmetric to contralateral extremity  Motor: TA/EHL/FHL/GS 5/5 and symmetric to contralateral extremity    Labs:                        11.7   8.69  )-----------( 246      ( 30 Jan 2022 07:20 )             36.2       01-30    139  |  106  |  20  ----------------------------<  112<H>  4.2   |  24  |  0.67    Ca    9.1      30 Jan 2022 07:20  Phos  3.0     01-29  Mg     2.1     01-29

## 2022-01-30 NOTE — PROVIDER CONTACT NOTE (OTHER) - ASSESSMENT
Pt denied chest pain, headache and SOB. Verbalized feeling fine. BP high- 171/101. Pt asymptomatic. Video  used.  name was Corby [id number].

## 2022-01-30 NOTE — PROVIDER CONTACT NOTE (OTHER) - DATE AND TIME:
[facility] [address]    [address]    [facility] [place] [address]    Phone:  [phone]       Thank You for choosing us for your health care visit. We are glad to serve you and happy to provide you with this summary of your visit. Please help us to ensure we have accurate records. If you find anything that needs to be changed, please let our staff know as soon as possible.          Your Demographic Information     Patient Name Sex     Christiana Campbell Female 1991       Ethnic Group Patient Race    Not of  or  Origin White      Your Visit Details     Date & Time Provider Department    2017 2:20 PM RASHAUN Stack [facility] [address]      Your To Do List     Future Orders Please Complete On or Around Expires    URINALYSIS WITH MICRO EXAM W/O C/S      Follow-Up    Return if symptoms worsen or fail to improve.      We Ordered or Performed the Following     URINALYSIS WITH MICRO EXAM W/O C/S     URINE CULTURE       Conditions Discussed Today or Order-Related Diagnoses        Comments    Acute UTI (urinary tract infection)    -  Primary     Pyelonephritis           Your Vitals Were     BP Pulse Temp Resp LMP SpO2    126/86 78 98.6 °F (37 °C) (Oral) 16 2017 98%    Smoking Status                   Never Smoker           Medications Prescribed or Re-Ordered Today     ciprofloxacin (CIPRO) 500 MG tablet    Sig - Route: Take 1 tablet by mouth 2 times daily for 10 days. - Oral    Class: Eprescribe    Pharmacy: [facility]/pharmacy #[id number] [facility] [id number]  [facility] [address] #: [phone]      Your Current Medications Are        Disp Refills Start End    ciprofloxacin (CIPRO) 500 MG tablet 20 tablet 0 2017    Sig - Route: Take 1 tablet by mouth 2 times daily for 10 days. - Oral    Class: Eprescribe    norethindrone-ethinyl estradiol-iron (ESTROSTEP FE) 1-20/1-30/-35  MG-MCG Tab        Sig - Route: Take by mouth daily. - Oral    Class: Historical Med    nitrofurantoin, macrocrystal-monohydrate, (MACROBID) 100 MG capsule 14 capsule 0 6/16/2017 6/23/2017    Sig - Route: Take 1 capsule by mouth 2 times daily for 7 days. - Oral    Class: Eprescribe    phenazopyridine (PYRIDIUM) 200 MG tablet 6 tablet 0 6/16/2017     Sig - Route: Take 1 tablet by mouth 3 times daily as needed for Pain. - Oral    Class: Eprescribe    omeprazole (PRILOSEC) 20 MG capsule 30 capsule 3 1/30/2017     Sig - Route: Take 1 capsule by mouth daily. - Oral    Class: Eprescribe    Naltrexone-Bupropion HCl ER 8-90 MG TABLET SR 12  tablet 3 1/30/2017     Sig - Route: Take 2 tablets by mouth 2 times daily. - Oral    Class: Eprescribe      Allergies     Penicillins Other (See Comments)    unsure      Problem List as of 6/19/2017     Gastroesophageal reflux disease without esophagitis    Morbid obesity with BMI of 45.0-49.9, adult (CMS/[facility])      Results of Testing Done Today     URINALYSIS WITH MICRO EXAM W/O C/S      Component Value Standard Range & Units    COLOR YELLOW YELLOW    APPEARANCE CLEAR     GLUCOSE(URINE) NEGATIVE NEGATIVE mg/dL    BILIRUBIN NEGATIVE NEGATIVE    KETONES NEGATIVE NEGATIVE mg/dL    SPECIFIC GRAVITY 1.015 1.005 - 1.030    BLOOD NEGATIVE NEGATIVE    pH 6.0 5.0 - 7.0 Units    PROTEIN(URINE) 30 NEGATIVE mg/dL    UROBILINOGEN 0.2 0.0 - 1.0 mg/dL    NITRITE POSITIVE NEGATIVE    LEUKOCYTE ESTERASE TRACE NEGATIVE    Squamous EPI'S 1 to 5 0 - 5 /hpf    RBC NONE SEEN 0 - 3 /hpf    WBC 1 to 5 0 - 5 /hpf    BACTERIA FEW NONE SEEN /hpf    Hyaline Casts NONE SEEN 0 - 5 /lpf    SPECIMEN TYPE URINE, CLEAN CATCH/MIDSTREAM                           Patient Instructions      MEDICATION: CIPRO  You have been prescribed Cipro (generic: ciprofloxacin), a type of antibiotic.  DIRECTIONS FOR USE:  Take this medicine on an empty stomach, 1 hour before or two hours after meals. Drink plenty of fluids while using  30-Jan-2022 03:44 this medicine. Take this medicine at regular intervals. If the label says “every 12 hours” this means twice a day. Doses do not have to be exactly 12 hours apart, but should be spaced out evenly twice a day. Take all the medicine until it is gone, even if you are feeling better. This will ensure that the infection is fully treated.  Do not take antacids, iron, Carafate (sucralfate) or zinc within two hours of a Cipro dose.  WHAT TO WATCH FOR:  POSSIBLE SIDE EFFECTS: Nausea, vomiting, diarrhea, abdominal pain, heartburn, joint aching, dizziness, fatigue, drowsiness (Contact your doctor if any of these symptoms persist or become severe).  ALLERGIC REACTION: Itching, rash, swelling, trouble breathing or swallowing (Contact your doctor or return to this facility promptly).  MEDICAL CONDITIONS: Before starting this medicine, be sure your doctor knows if you have any of the following conditions:  · Pregnancy, breastfeeding, less than 18 years of age  · Seizure disorder, kidney disease  DRUG INTERACTIONS: Before starting this medicine, be sure your doctor knows if you are taking any of the following drugs:  · Warfarin  · Theophylline (Theodur, Aminophylline, Slo-phyllin, Somophyllin and other brand names)  · Probenecid  FOOD INTERACTIONS:  · Caffeine may have a stronger effect on you while taking this medicine; decrease caffeine amount by half.  · Do not take this medicine with food or dairy products such as milk.  WARNINGS:  · Do not drive, ride a bicycle, or operate dangerous equipment while taking this medicine until you know how it will affect you.  · Sensitivity to sunlight may develop. Avoid the sun or use sunscreen while taking this medicine.  [NOTE: This information topic may not include all directions, precautions, medical conditions, drug/food interactions and warnings for this drug. Check with your doctor, nurse, or pharmacist for any questions that you may have.]  © 1354-0707 [employer], [address] [place] [address]  Road, [place] [address]. All rights reserved. This information is not intended as a substitute for professional medical care. Always follow your healthcare professional's instructions.  Kidney Infection (Adult, Female)    An infection of the kidney is also called \"pyelonephritis.\" It usually starts as a bladder infection (\"cystitis\") that spreads to the kidneys. Pyelonephritis is more serious than a bladder infection. It can cause severe illness if not treated properly.  The usual symptoms include an aching pain in the back, side, or lower abdomen. Other symptoms may include fever, chills, nausea, vomiting, blood in the urine, an urge to urinate, and a burning sensation when urinating. Treatment is usually oral antibiotics, or in more severe cases, intramuscular or IV antibiotics. These are started right away and may be changed once urine culture results determine the infecting organisms.  Home care  The following are general care guidelines:  1. Stay home from work or school. Rest in bed until your fever breaks and you are feeling better.  2. Drink lots of fluid (at least 6 to 8 glasses a day, unless you must restrict fluids for other medical reasons). This will force the medicine into your urinary system and flush the bacteria out of your body.  3. Avoid sex until you have finished all of your medicine and your symptoms are gone.  4. Avoid caffeine, alcohol, and spicy foods. These foods may irritate the kidney and bladder.  5. You may use acetaminophen or ibuprofen to control pain, unless another pain medicine was prescribed. [NOTE: If you have chronic liver or kidney disease or ever had a stomach ulcer or GI bleeding, talk with your doctor before using these medicines.]  Follow-up care  Follow up with your doctor or as advised by our staff for a repeat urine test in 10 days. This will ensure that your infection is fully cleared.  [NOTE: If you had an X-ray, CT scan, or other diagnostic test, it will be reviewed  by a specialist. You will be notified of any new findings that may affect your care.]  When to seek medical care  Get prompt medical attention if any of the following occur:  · Fever over 100.4°F (38.0°C) after 48 hours of treatment  · No improvement by the third day of treatment  · Increasing back or abdominal pain  · Repeated vomiting or inability to take oral medicine  · Weakness, dizziness, or fainting  © 7911-7025 [employer]. [address] [place] [address], [place] [address]. All rights reserved. This information is not intended as a substitute for professional medical care. Always follow your healthcare professional's instructions.  If you develop severe flank pain with fever please go immediately to the emergency room. Take Tylenol or Ibuprofen for fever or pain.

## 2022-01-30 NOTE — PROVIDER CONTACT NOTE (OTHER) - ACTION/TREATMENT ORDERED:
Milton Matias MD notified. MD Florencio verbalized that a similar incident happened yesterday and trembling had stopped. Verbalized to continue monitoring pt. No other interventions provided.

## 2022-01-31 VITALS
RESPIRATION RATE: 16 BRPM | SYSTOLIC BLOOD PRESSURE: 107 MMHG | HEART RATE: 55 BPM | OXYGEN SATURATION: 96 % | TEMPERATURE: 98 F | DIASTOLIC BLOOD PRESSURE: 63 MMHG

## 2022-01-31 LAB
ANION GAP SERPL CALC-SCNC: 11 MMOL/L — SIGNIFICANT CHANGE UP (ref 5–17)
BUN SERPL-MCNC: 14 MG/DL — SIGNIFICANT CHANGE UP (ref 7–23)
CALCIUM SERPL-MCNC: 9.3 MG/DL — SIGNIFICANT CHANGE UP (ref 8.4–10.5)
CHLORIDE SERPL-SCNC: 102 MMOL/L — SIGNIFICANT CHANGE UP (ref 96–108)
CO2 SERPL-SCNC: 24 MMOL/L — SIGNIFICANT CHANGE UP (ref 22–31)
CREAT SERPL-MCNC: 0.61 MG/DL — SIGNIFICANT CHANGE UP (ref 0.5–1.3)
GLUCOSE SERPL-MCNC: 89 MG/DL — SIGNIFICANT CHANGE UP (ref 70–99)
HCT VFR BLD CALC: 38.7 % — SIGNIFICANT CHANGE UP (ref 34.5–45)
HGB BLD-MCNC: 11.9 G/DL — SIGNIFICANT CHANGE UP (ref 11.5–15.5)
MCHC RBC-ENTMCNC: 28.5 PG — SIGNIFICANT CHANGE UP (ref 27–34)
MCHC RBC-ENTMCNC: 30.7 GM/DL — LOW (ref 32–36)
MCV RBC AUTO: 92.6 FL — SIGNIFICANT CHANGE UP (ref 80–100)
NRBC # BLD: 0 /100 WBCS — SIGNIFICANT CHANGE UP (ref 0–0)
PLATELET # BLD AUTO: 248 K/UL — SIGNIFICANT CHANGE UP (ref 150–400)
POTASSIUM SERPL-MCNC: 4.7 MMOL/L — SIGNIFICANT CHANGE UP (ref 3.5–5.3)
POTASSIUM SERPL-SCNC: 4.7 MMOL/L — SIGNIFICANT CHANGE UP (ref 3.5–5.3)
RBC # BLD: 4.18 M/UL — SIGNIFICANT CHANGE UP (ref 3.8–5.2)
RBC # FLD: 13.6 % — SIGNIFICANT CHANGE UP (ref 10.3–14.5)
SODIUM SERPL-SCNC: 137 MMOL/L — SIGNIFICANT CHANGE UP (ref 135–145)
WBC # BLD: 7.88 K/UL — SIGNIFICANT CHANGE UP (ref 3.8–10.5)
WBC # FLD AUTO: 7.88 K/UL — SIGNIFICANT CHANGE UP (ref 3.8–10.5)

## 2022-01-31 PROCEDURE — 84484 ASSAY OF TROPONIN QUANT: CPT

## 2022-01-31 PROCEDURE — C1776: CPT

## 2022-01-31 PROCEDURE — 71045 X-RAY EXAM CHEST 1 VIEW: CPT

## 2022-01-31 PROCEDURE — 83735 ASSAY OF MAGNESIUM: CPT

## 2022-01-31 PROCEDURE — 82553 CREATINE MB FRACTION: CPT

## 2022-01-31 PROCEDURE — 84443 ASSAY THYROID STIM HORMONE: CPT

## 2022-01-31 PROCEDURE — 84100 ASSAY OF PHOSPHORUS: CPT

## 2022-01-31 PROCEDURE — 85027 COMPLETE CBC AUTOMATED: CPT

## 2022-01-31 PROCEDURE — 99233 SBSQ HOSP IP/OBS HIGH 50: CPT

## 2022-01-31 PROCEDURE — 93306 TTE W/DOPPLER COMPLETE: CPT | Mod: 26

## 2022-01-31 PROCEDURE — 97162 PT EVAL MOD COMPLEX 30 MIN: CPT

## 2022-01-31 PROCEDURE — 93306 TTE W/DOPPLER COMPLETE: CPT

## 2022-01-31 PROCEDURE — 80048 BASIC METABOLIC PNL TOTAL CA: CPT

## 2022-01-31 PROCEDURE — S2900: CPT

## 2022-01-31 PROCEDURE — 73560 X-RAY EXAM OF KNEE 1 OR 2: CPT

## 2022-01-31 PROCEDURE — 97110 THERAPEUTIC EXERCISES: CPT

## 2022-01-31 PROCEDURE — 36415 COLL VENOUS BLD VENIPUNCTURE: CPT

## 2022-01-31 PROCEDURE — 97530 THERAPEUTIC ACTIVITIES: CPT

## 2022-01-31 PROCEDURE — 97116 GAIT TRAINING THERAPY: CPT

## 2022-01-31 PROCEDURE — 82550 ASSAY OF CK (CPK): CPT

## 2022-01-31 PROCEDURE — C1713: CPT

## 2022-01-31 RX ORDER — APIXABAN 2.5 MG/1
1 TABLET, FILM COATED ORAL
Qty: 0 | Refills: 0 | DISCHARGE
Start: 2022-01-31

## 2022-01-31 RX ORDER — OXYCODONE HYDROCHLORIDE 5 MG/1
1 TABLET ORAL
Qty: 42 | Refills: 0
Start: 2022-01-31 | End: 2022-02-06

## 2022-01-31 RX ORDER — APIXABAN 2.5 MG/1
1 TABLET, FILM COATED ORAL
Qty: 60 | Refills: 0
Start: 2022-01-31 | End: 2022-03-01

## 2022-01-31 RX ADMIN — Medication 2 TABLET(S): at 09:41

## 2022-01-31 RX ADMIN — OXYCODONE HYDROCHLORIDE 10 MILLIGRAM(S): 5 TABLET ORAL at 09:40

## 2022-01-31 RX ADMIN — Medication 1 TABLET(S): at 09:40

## 2022-01-31 RX ADMIN — APIXABAN 5 MILLIGRAM(S): 2.5 TABLET, FILM COATED ORAL at 05:53

## 2022-01-31 RX ADMIN — Medication 975 MILLIGRAM(S): at 06:50

## 2022-01-31 RX ADMIN — BENZOCAINE AND MENTHOL 1 LOZENGE: 5; 1 LIQUID ORAL at 13:01

## 2022-01-31 RX ADMIN — Medication 975 MILLIGRAM(S): at 13:02

## 2022-01-31 RX ADMIN — Medication 30 MILLILITER(S): at 09:40

## 2022-01-31 RX ADMIN — Medication 1 MILLIGRAM(S): at 09:40

## 2022-01-31 RX ADMIN — OXYCODONE HYDROCHLORIDE 10 MILLIGRAM(S): 5 TABLET ORAL at 10:00

## 2022-01-31 RX ADMIN — BENZOCAINE AND MENTHOL 1 LOZENGE: 5; 1 LIQUID ORAL at 05:53

## 2022-01-31 RX ADMIN — CELECOXIB 200 MILLIGRAM(S): 200 CAPSULE ORAL at 05:53

## 2022-01-31 RX ADMIN — SIMETHICONE 80 MILLIGRAM(S): 80 TABLET, CHEWABLE ORAL at 09:40

## 2022-01-31 RX ADMIN — Medication 975 MILLIGRAM(S): at 05:53

## 2022-01-31 RX ADMIN — Medication 12.5 MILLIGRAM(S): at 05:52

## 2022-01-31 RX ADMIN — CELECOXIB 200 MILLIGRAM(S): 200 CAPSULE ORAL at 06:50

## 2022-01-31 RX ADMIN — Medication 975 MILLIGRAM(S): at 13:15

## 2022-01-31 NOTE — PROGRESS NOTE ADULT - SUBJECTIVE AND OBJECTIVE BOX
Ortho Note    Subjective:  Pt comfortable without complaints, pain controlled with current pain medication.   Denies CP, SOB, N/V, numbness/tingling   Patient reports gas pain- Maaolox PO given     Vital Signs Last 24 Hrs  T(C): 36.4 (01-31-22 @ 05:45), Max: 36.4 (01-31-22 @ 05:45)  T(F): 97.6 (01-31-22 @ 05:45), Max: 97.6 (01-31-22 @ 05:45)  HR: 63 (01-31-22 @ 05:45) (63 - 63)  BP: 131/81 (01-31-22 @ 05:45) (131/81 - 131/81)  BP(mean): --  RR: 18 (01-31-22 @ 05:45) (18 - 18)  SpO2: 100% (01-31-22 @ 05:45) (100% - 100%)  AVSS    Objective:    Physical Exam:  General: Pt Alert and oriented, NAD  Left TKA aquacel DSG C/D/I  Pulses: +2 pedal pulses, wwp toes, cap refill less than 3 seconds  Sensation: silt intact  Motor: EHL/FHL/TA/GS- firing        Plan of Care:  A/P: 71yFemale POD#1 s/p Left TKA  - afebrile, nontoxic apperance  - Pain Control- tylenol 975mg PO Q8h, celebrex 200mg PO BID, Dilaudid 1mg Q4h prn breakthrough pain, Oxycodone 5-10mg PO Q4h prn moderate to severe pain   - DVT ppx: apixaban 5mg PO BID  - PT, WBS: WBAT LLE  - appreciate cardiology recs  - TTE today   - dispo- home pending cardiology clearance    Ortho Pager [phone]

## 2022-01-31 NOTE — PROGRESS NOTE ADULT - SUBJECTIVE AND OBJECTIVE BOX
Ortho Note    Pt comfortable, pain controlled  Denies CP, SOB, N/V, numbness/tingling     Vital Signs Last 24 Hrs  T(C): 36.4 (31 Jan 2022 05:45), Max: 36.7 (30 Jan 2022 08:35)  T(F): 97.6 (31 Jan 2022 05:45), Max: 98.1 (30 Jan 2022 08:35)  HR: 63 (31 Jan 2022 05:45) (63 - 74)  BP: 131/81 (31 Jan 2022 05:45) (113/73 - 142/68)  BP(mean): --  RR: 18 (31 Jan 2022 05:45) (16 - 18)  SpO2: 100% (31 Jan 2022 05:45) (97% - 100%)    VSS this AM  General: Sierra Leonean speaking, A&Ox3, NAD  LLE: Aquacell DSG C/D/I  Pulses: Foot WWP; DP pulse 2+; Cap refill < 2 sec  Sensation: SILT distally and symmetric to contralateral extremity  Motor: TA/EHL/FHL/GS 5/5 and symmetric to contralateral extremity    A/P: 71yFemale s/p L TKA on 01/28 c/b 1 episode of Afib w RVR on 01/29  - Stable following resolution of Afib  - Pain/Nausea Control  - Home meds  - AM labs stable  - DVT ppx: ASA d/c'ed, switched to Eliquis 5mg BID  - WBS: WBAT LLE  - PT: home -- cleared PT 01/29  - Echo today  - Final cards recs -- f/u w cards team re: Echo timing, clearance for discharge -- Likely d/c 01/31      Ortho Pager [phone]

## 2022-01-31 NOTE — PROGRESS NOTE ADULT - ASSESSMENT
A/P: 71yFemale s/p L TKA on 01/28 c/b 1 episode of Afib w RVR on 01/29  - Stable following resolution of Afib  - Pain/Nausea Control  - Home meds  - AM labs stable  - DVT ppx: ASA d/c'ed, switched to Eliquis 5mg BID  - WBS: WBAT LLE  - PT: home -- cleared PT 01/29  - Cards consult recs appreciated -- f/u w cards team re: Echo timing, clearance for discharge -- Likely d/c 01/31      Ortho Pager [phone]
72 yo Female with PMHx of admitted for LEFT TKR with post op course c/b  afib with RVR, now in NSR    1) Afib with RVR, now in NSR  -Patient with no Hx of Afib, with episode of Afib with RVR with rates in the 160's post operatively with conversion to NSR after IV lopressor  -Tele Reviewed, patient remains in NSR with frequent PACS  -Echo performed showing Preserved BIV function without valvular pathology  -TSH Elevated, suggesting possible Hypothyroid state. Please add on T3-T4  - CHADVASC of 2-3 ( unclear if patient has HTN) placing patient at higher thromboembolic risk annualy.   -Please DC on Eliquis 5 mg bid  and Metoprolol Succinate 25 mg PO daily.  -Please ensure outpatient follow up with Dr Urias within 2 weeks of DC: [phone]

## 2022-01-31 NOTE — PROGRESS NOTE ADULT - SUBJECTIVE AND OBJECTIVE BOX
Interval HPI:      PAST MEDICAL & SURGICAL HISTORY:  Osteoarthritis  knee  Osteopenia  Gastritis  H/O:     Home Medications:  acetaminophen 325 mg oral tablet: 3 tab(s) orally every 8 hours (2022 14:03)  aspirin 81 mg oral delayed release tablet: 1 tab(s) orally 2 times a day (2022 14:03)  celecoxib 200 mg oral capsule: 1 cap(s) orally every 12 hours (2022 14:03)  oxyCODONE 5 mg oral tablet: 1 tab(s) orally every 4 hours, As needed, Moderate Pain (4 - 6) (2022 14:03)  pantoprazole 40 mg oral delayed release tablet: 1 tab(s) orally once a day (before a meal) (2022 14:28)    MEDICATIONS  (STANDING):  acetaminophen     Tablet .. 975 milliGRAM(s) Oral every 8 hours  apixaban 5 milliGRAM(s) Oral two times a day  benzocaine 15 mG/menthol 3.6 mG Lozenge 1 Lozenge Oral three times a day  BUpivacaine liposome 1.3% Injectable (no eMAR) 20 milliLiter(s) Local Injection once  celecoxib 200 milliGRAM(s) Oral every 12 hours  folic acid 1 milliGRAM(s) Oral daily  lactated ringers. 1000 milliLiter(s) (100 mL/Hr) IV Continuous <Continuous>  metoprolol tartrate 12.5 milliGRAM(s) Oral two times a day  multivitamin 1 Tablet(s) Oral daily  polyethylene glycol 3350 17 Gram(s) Oral at bedtime  senna 2 Tablet(s) Oral at bedtime    MEDICATIONS  (PRN):  aluminum hydroxide/magnesium hydroxide/simethicone Suspension 30 milliLiter(s) Oral every 4 hours PRN Dyspepsia  bisacodyl Suppository 10 milliGRAM(s) Rectal once PRN Constipation  calcium carbonate    500 mG (Tums) Chewable 2 Tablet(s) Chew every 6 hours PRN Indigestion  HYDROmorphone  Injectable 1 milliGRAM(s) IV Push every 4 hours PRN breakthrough  HYDROmorphone  Injectable 0.5 milliGRAM(s) IV Push every 15 minutes PRN pacu  magnesium hydroxide Suspension 30 milliLiter(s) Oral daily PRN Constipation  ondansetron Injectable 4 milliGRAM(s) IV Push every 6 hours PRN Nausea and/or Vomiting  oxyCODONE    IR 5 milliGRAM(s) Oral every 4 hours PRN Moderate Pain (4 - 6)  oxyCODONE    IR 10 milliGRAM(s) Oral every 4 hours PRN Severe Pain (7 - 10)  simethicone 80 milliGRAM(s) Chew every 6 hours PRN Gas    .  VITAL SIGNS:  T(C): 36.4 (22 @ 05:45), Max: 36.7 (22 @ 13:00)  T(F): 97.6 (22 @ 05:45), Max: 98.1 (22 @ 13:00)  HR: 63 (22 @ 05:45) (63 - 74)  BP: 131/81 (22 @ 05:45) (113/73 - 142/68)  BP(mean): --  RR: 18 (22 @ 05:45) (16 - 18)  SpO2: 100% (22 @ 05:45) (97% - 100%)  Wt(kg): --    PHYSICAL EXAM: INCOMPLETE    Constitutional: WDWN resting comfortably in bed; NAD  Head: NC/AT  Eyes: PERRL, EOMI, anicteric sclera  ENT: no nasal discharge; uvula midline, no oropharyngeal erythema or exudates; MMM  Neck: supple; no JVD or thyromegaly  Respiratory: CTA B/L; no W/R/R, no retractions  Cardiac: +S1/S2; RRR; no M/R/G; PMI non-displaced  Gastrointestinal: soft, NT/ND; no rebound or guarding; +BSx4  Genitourinary: normal external genitalia  Back: spine midline, no bony tenderness or step-offs; no CVAT B/L  Extremities: WWP, no clubbing or cyanosis; no peripheral edema  Musculoskeletal: NROM x4; no joint swelling, tenderness or erythema  Vascular: 2+ radial, femoral, DP/PT pulses B/L  Dermatologic: skin warm, dry and intact; no rashes, wounds, or scars  Lymphatic: no submandibular or cervical LAD  Neurologic: AAOx3; CNII-XII grossly intact; no focal deficits  Psychiatric: affect and characteristics of appearance, verbalizations, behaviors are appropriate      .  LABS:                         11.9   7.88  )-----------( 248      ( 2022 08:15 )             38.7         139  |  106  |  20  ----------------------------<  112<H>  4.2   |  24  |  0.67    Ca    9.1      2022 07:20  Phos  3.0       Mg     2.1               CARDIAC MARKERS ( 2022 18:59 )  x     / 0.01 ng/mL / 263 U/L / x     / 5.0 ng/mL                  RADIOLOGY, EKG & ADDITIONAL TESTS: Reviewed.  Interval HPI: Patient feels well and is in NAD.       PAST MEDICAL & SURGICAL HISTORY:  Osteoarthritis  knee  Osteopenia  Gastritis  H/O:     Home Medications:  acetaminophen 325 mg oral tablet: 3 tab(s) orally every 8 hours (2022 14:03)  aspirin 81 mg oral delayed release tablet: 1 tab(s) orally 2 times a day (2022 14:03)  celecoxib 200 mg oral capsule: 1 cap(s) orally every 12 hours (2022 14:03)  oxyCODONE 5 mg oral tablet: 1 tab(s) orally every 4 hours, As needed, Moderate Pain (4 - 6) (2022 14:03)  pantoprazole 40 mg oral delayed release tablet: 1 tab(s) orally once a day (before a meal) (2022 14:28)    MEDICATIONS  (STANDING):  acetaminophen     Tablet .. 975 milliGRAM(s) Oral every 8 hours  apixaban 5 milliGRAM(s) Oral two times a day  benzocaine 15 mG/menthol 3.6 mG Lozenge 1 Lozenge Oral three times a day  BUpivacaine liposome 1.3% Injectable (no eMAR) 20 milliLiter(s) Local Injection once  celecoxib 200 milliGRAM(s) Oral every 12 hours  folic acid 1 milliGRAM(s) Oral daily  lactated ringers. 1000 milliLiter(s) (100 mL/Hr) IV Continuous <Continuous>  metoprolol tartrate 12.5 milliGRAM(s) Oral two times a day  multivitamin 1 Tablet(s) Oral daily  polyethylene glycol 3350 17 Gram(s) Oral at bedtime  senna 2 Tablet(s) Oral at bedtime    MEDICATIONS  (PRN):  aluminum hydroxide/magnesium hydroxide/simethicone Suspension 30 milliLiter(s) Oral every 4 hours PRN Dyspepsia  bisacodyl Suppository 10 milliGRAM(s) Rectal once PRN Constipation  calcium carbonate    500 mG (Tums) Chewable 2 Tablet(s) Chew every 6 hours PRN Indigestion  HYDROmorphone  Injectable 1 milliGRAM(s) IV Push every 4 hours PRN breakthrough  HYDROmorphone  Injectable 0.5 milliGRAM(s) IV Push every 15 minutes PRN pacu  magnesium hydroxide Suspension 30 milliLiter(s) Oral daily PRN Constipation  ondansetron Injectable 4 milliGRAM(s) IV Push every 6 hours PRN Nausea and/or Vomiting  oxyCODONE    IR 5 milliGRAM(s) Oral every 4 hours PRN Moderate Pain (4 - 6)  oxyCODONE    IR 10 milliGRAM(s) Oral every 4 hours PRN Severe Pain (7 - 10)  simethicone 80 milliGRAM(s) Chew every 6 hours PRN Gas    .  VITAL SIGNS:  T(C): 36.4 (22 @ 05:45), Max: 36.7 (22 @ 13:00)  T(F): 97.6 (22 @ 05:45), Max: 98.1 (22 @ 13:00)  HR: 63 (22 @ 05:45) (63 - 74)  BP: 131/81 (22 @ 05:45) (113/73 - 142/68)  BP(mean): --  RR: 18 (22 @ 05:45) (16 - 18)  SpO2: 100% (22 @ 05:45) (97% - 100%)  Wt(kg): --    PHYSICAL EXAM:     Constitutional: WDWN resting comfortably in bed; NAD  ENT:MMM  Neck: supple; no JVD   Respiratory: CTA B/L; no W/R/R,   Cardiac: +S1/S2; RRR; no M/R/G;   Gastrointestinal: soft, NT/ND; no rebound or guarding; +BS  Extremities: WWP, no clubbing or cyanosis; no peripheral edema  Vascular: 2+ radial, DP/PT pulses B/L  Neurologic: AAOx3; CNII-XII grossly intact; no focal deficits        .  LABS:                         11.9   7.88  )-----------( 248      ( 2022 08:15 )             38.7         139  |  106  |  20  ----------------------------<  112<H>  4.2   |  24  |  0.67    Ca    9.1      2022 07:20  Phos  3.0     -  Mg     2.1               CARDIAC MARKERS ( 2022 18:59 )  x     / 0.01 ng/mL / 263 U/L / x     / 5.0 ng/mL                  RADIOLOGY, EKG & ADDITIONAL TESTS: Reviewed.

## 2022-01-31 NOTE — PROGRESS NOTE ADULT - ATTENDING COMMENTS
Initial attending contact date 1/31/22     . See fellow note written above for details. I reviewed the fellow documentation. I have personally seen and examined this patient. I reviewed vitals, labs, medications, cardiac studies, and additional imaging. I agree with the above fellow's findings and plans as written above with the following additions/statements.    71 y. o Wolof-speaking Female with PMHx of obesity s/p LEFT TKR.   Cardiology was consulted for new onset afib with RVR.  -On tele: maintaining NSR   -Awaiting ECHO  -Transition to toprol 25 mg q  -Cont eliquis 5 mg bid

## 2022-02-03 DIAGNOSIS — I48.91 UNSPECIFIED ATRIAL FIBRILLATION: ICD-10-CM

## 2022-02-03 DIAGNOSIS — E66.9 OBESITY, UNSPECIFIED: ICD-10-CM

## 2022-02-03 DIAGNOSIS — M17.12 UNILATERAL PRIMARY OSTEOARTHRITIS, LEFT KNEE: ICD-10-CM

## 2022-02-03 DIAGNOSIS — K59.00 CONSTIPATION, UNSPECIFIED: ICD-10-CM

## 2022-02-08 PROBLEM — M85.80 OTHER SPECIFIED DISORDERS OF BONE DENSITY AND STRUCTURE, UNSPECIFIED SITE: Chronic | Status: ACTIVE | Noted: 2022-01-27

## 2022-02-08 PROBLEM — M19.90 UNSPECIFIED OSTEOARTHRITIS, UNSPECIFIED SITE: Chronic | Status: ACTIVE | Noted: 2022-01-27

## 2022-02-15 ENCOUNTER — APPOINTMENT (OUTPATIENT)
Dept: ORTHOPEDIC SURGERY | Facility: CLINIC | Age: 72
End: 2022-02-15
Payer: MEDICARE

## 2022-02-15 VITALS — SYSTOLIC BLOOD PRESSURE: 146 MMHG | DIASTOLIC BLOOD PRESSURE: 75 MMHG

## 2022-02-15 PROCEDURE — 99024 POSTOP FOLLOW-UP VISIT: CPT

## 2022-02-15 NOTE — HISTORY OF PRESENT ILLNESS
[de-identified] : First post op left total knee replacement, surgical date 1/28/22 [de-identified] : Reports pain is controlled without opioids. Has been participating in home PT and HEP. Compliant with ASA. Ambulating with walker. No wound or systemic issues. [de-identified] : L knee incision well approximated, benign appearing. Tibial stab incisions healed, sutures removed. ROM 0-90, lig stable shane/cordell/ant/post, good quad strength. Ambulating with stiff-legged left knee gait and walker. [de-identified] : Transition to outpatient PT\par Wean assistive devices as tolerated\par RTC 4wk with left knee XRs [de-identified] : 70y/o female about 2.5wk s/p L TKA

## 2022-03-15 ENCOUNTER — RESULT REVIEW (OUTPATIENT)
Age: 72
End: 2022-03-15

## 2022-03-15 ENCOUNTER — APPOINTMENT (OUTPATIENT)
Dept: ORTHOPEDIC SURGERY | Facility: CLINIC | Age: 72
End: 2022-03-15
Payer: MEDICARE

## 2022-03-15 ENCOUNTER — OUTPATIENT (OUTPATIENT)
Dept: OUTPATIENT SERVICES | Facility: HOSPITAL | Age: 72
LOS: 1 days | End: 2022-03-15
Payer: MEDICARE

## 2022-03-15 VITALS
BODY MASS INDEX: 40.05 KG/M2 | SYSTOLIC BLOOD PRESSURE: 133 MMHG | HEART RATE: 81 BPM | HEIGHT: 60 IN | WEIGHT: 204 LBS | OXYGEN SATURATION: 98 % | DIASTOLIC BLOOD PRESSURE: 80 MMHG

## 2022-03-15 DIAGNOSIS — M17.0 BILATERAL PRIMARY OSTEOARTHRITIS OF KNEE: ICD-10-CM

## 2022-03-15 DIAGNOSIS — Z98.891 HISTORY OF UTERINE SCAR FROM PREVIOUS SURGERY: Chronic | ICD-10-CM

## 2022-03-15 PROCEDURE — 73564 X-RAY EXAM KNEE 4 OR MORE: CPT | Mod: 26,LT

## 2022-03-15 PROCEDURE — 73564 X-RAY EXAM KNEE 4 OR MORE: CPT

## 2022-03-15 PROCEDURE — 99024 POSTOP FOLLOW-UP VISIT: CPT

## 2022-03-15 NOTE — HISTORY OF PRESENT ILLNESS
[de-identified] : Second post op left total knee replacement, surgical date 1/28/22 [de-identified] : Doing well. Finishing up OPT and transitioning to HEP. Using cane. Notes functional LLD now from R knee varus, as well as increasing R knee pain. Would like to book R TKA in June after returning from a month-long trip to [place]. [de-identified] : L knee: incision healed, benign appearing. ROM 0-120, stable shane/cordell/ant/post, good quad strength. Mild swelling. \par \par Right knee:\par - Soft tissue swelling: mild\par - Ecchymosis: none\par - Erythema: none\par - Effusion: small\par - Wounds: none\par - Alignment: minimally correctable varus\par - Tenderness: circumferential\par - ROM: 0-125\par - Collateral laxity: none\par - Cruciate laxity: none\par - Popliteal angle (degrees): 30\par - Quad strength: 5/5 \par \par Ambulating with cane. [de-identified] : L knee XRs taken today demonstrate stable position of the components without evidence of mechanical complication. Patella sits at appropriate height and tracks centrally. [de-identified] : 71y/o female about 6 weeks s/p L TKA, doing well; with right knee osteoarthritis [de-identified] : She is indicated for R TKA\par We discussed the details of the procedure, the expected recovery period, and the expected outcome. We discussed the likelihood of satisfaction after complete recovery, and the potential causes of dissatisfaction. The importance of active patient participation in the rehabilitation protocol was emphasized, along with its influence on short and long-term outcomes. Specific risks of total knee replacement were discussed in detail. We discussed the risk of surgical site complications including but not limited to: surgical site infection, wound healing complications, bone fracture, tendon or ligament injury, neurovascular injury, hemorrhage, postoperative stiffness or instability, persistent pain and need for reoperation or manipulation under anesthesia. We discussed surgical blood loss and the possible need for blood transfusion. We discussed the risk of perioperative medical complications, including but not limited to catheter-associated urinary tract infection, venous thromboembolism and other cardiopulmonary complications. We discussed anesthetic options and the risk of anesthesia-related complications. We discussed implant fixation methods; my plan would be to use fully cemented fixation in this case. We discussed the variable need to resurface the patella; my plan would be to resurface in this case. We discussed the durability of prosthetic knees and limitations related to wear, osteolysis and loosening.  We discussed the role of the robot in helping to guide bone resections and measure alignment and soft tissue balance. All questions were answered the patient's satisfaction. The patient was given a copy of my preoperative packet with additional information about the procedure. I asked the patient to either call back or schedule a followup appointment for any additional questions or concerns regarding the procedure.\par Book for surgery at a convenient time; late June after trip to [place]\par Repeat standard medical clearance\par Cont HEP

## 2022-03-17 ENCOUNTER — NON-APPOINTMENT (OUTPATIENT)
Age: 72
End: 2022-03-17

## 2022-03-18 ENCOUNTER — NON-APPOINTMENT (OUTPATIENT)
Age: 72
End: 2022-03-18

## 2022-03-18 ENCOUNTER — APPOINTMENT (OUTPATIENT)
Dept: HEART AND VASCULAR | Facility: CLINIC | Age: 72
End: 2022-03-18
Payer: MEDICARE

## 2022-03-18 VITALS
TEMPERATURE: 97.1 F | WEIGHT: 204 LBS | OXYGEN SATURATION: 97 % | HEIGHT: 60 IN | DIASTOLIC BLOOD PRESSURE: 79 MMHG | SYSTOLIC BLOOD PRESSURE: 115 MMHG | HEART RATE: 70 BPM | BODY MASS INDEX: 40.05 KG/M2

## 2022-03-18 DIAGNOSIS — R07.9 CHEST PAIN, UNSPECIFIED: ICD-10-CM

## 2022-03-18 PROCEDURE — 99214 OFFICE O/P EST MOD 30 MIN: CPT | Mod: 25

## 2022-03-18 PROCEDURE — 93000 ELECTROCARDIOGRAM COMPLETE: CPT

## 2022-03-18 PROCEDURE — 36415 COLL VENOUS BLD VENIPUNCTURE: CPT

## 2022-03-18 RX ORDER — IBUPROFEN 600 MG/1
600 TABLET, FILM COATED ORAL
Qty: 60 | Refills: 0 | Status: DISCONTINUED | COMMUNITY
Start: 2017-10-04 | End: 2022-03-18

## 2022-03-18 RX ORDER — IBUPROFEN 800 MG/1
800 TABLET ORAL
Qty: 30 | Refills: 0 | Status: DISCONTINUED | COMMUNITY
Start: 2017-09-25 | End: 2022-03-18

## 2022-03-18 RX ORDER — ASPIRIN 81 MG/1
81 TABLET ORAL
Qty: 60 | Refills: 0 | Status: DISCONTINUED | COMMUNITY
Start: 2022-01-27 | End: 2022-03-18

## 2022-03-20 RX ORDER — SIMVASTATIN 20 MG/1
20 TABLET, FILM COATED ORAL
Qty: 90 | Refills: 0 | Status: DISCONTINUED | COMMUNITY
Start: 2017-11-01 | End: 2022-03-20

## 2022-03-20 NOTE — DISCUSSION/SUMMARY
[FreeTextEntry1] : Ms. Jacobo is a 73yo W with obesity, HL who was recently admitted for left TKR and found to have atrial fibrillation and is here to establish care. Also with complaints of chest pain.\par \par #Atrial fibrillation:\par -seems to be confined to post-op period but given [id number] of at least 2, was recommended to start on anticoag; was also discharged on beta blocker\par -today in NSR\par -ok to stay off beta blocker\par -recommend 30d monitor to assess if any paroxysmal afib episodes -- pt wants to wait until returns from traveling (has trip planned in a couple of weeks)\par -given no bleeding issues, will favor restarting anticoagulation while awaiting monitoring results to reduce thromboembolism risk \par -will confirm with Dr. Koehler if needs to continue celecoxib to minimize bleeding risk before restarting apixaban\par -check cbc, cmp, TSH (was elevated as inpt)\par \par #Chest pain:\par -TTE in Jan as above\par -no active symptoms\par -would favor r/o ischemic etiology kay given planned for other knee surgery and has limited functional capacity -- discussed pharm nuc stress and she is agreeable\par \par #HL:\par -was on statin before, unclear why stopped\par -check lipid profile\par \par Follow-up after above testing, discuss other ASCVD preventive strategies\par

## 2022-03-20 NOTE — HISTORY OF PRESENT ILLNESS
[FreeTextEntry1] : Ms. Jacobo is a 71yo W with obesity, HL who was recently admitted for left TKR and found to have atrial fibrillation and is here to establish care. \par \par Ukrainian speaking, here with son who is translating per pt preference. \par \par Admitted in 2022. Postop had afib with RVR, was treated with IV metoprolol (no EKGs available to review). Then converted to NSR. Had TTE done. TSH mildly elevated. \par \par Primary Care: Trinh Jimenez\par -saw her prior to surgery and was told no issues\par \par No cardiac testing prior to the hospitalization\par Planned for other knee replacement in 2022\par \par Atrial fibrillation:\par -discharged on apixaban 5mg BID and metoprolol succinate 25mg daily -- didn't continue because it had no refills\par \par No bleeding issues\par \par Chest pain: can occur at rest or with walking\par -feels better after taking aspirin\par -center of chest\par -described as discomfort\par -no radiation\par -no palpitations -- only in the hospital, has not had it since\par -new this year\par -has happened 4-5 times\par -associated with fogginess -- lasts for about 30 minutes\par \par Has history of gastritis -- got medication that helped\par \par No issues with anesthesia\par Mobility limited so not as physically active\par Walks with a cane\par \par 2022 Labs:\par Cr 0.61\par TSH 5.7\par \par PMH/PSH:\par as above; also\par OA\par osteopenia\par gastritis\par \par \par FH:\par denies history of heart disease\par \par ALL:\par NKDA\par \par ROS:\par No fever/chills\par No nausea/vomiting\par \par Lifestyle History:\par Mediterranean Diet Score (9 question survey) was 7\par (8-9: optimal, 6-7: near-optimal, 4-5: suboptimal, 0-3: markedly suboptimal)\par Exercise: Limited mobility, uses cane\par Smoking: Never smoker\par Does not know about snoring, witnessed apnea episodes; no excessive daytime fatigue\par \par No PCOS\par 9 pregnancies\par no pregnancy complications\par no miscarriages\par menopause age 52\par no HRT

## 2022-03-20 NOTE — PHYSICAL EXAM
[Normal Conjunctiva] : normal conjunctiva [Normal S1, S2] : normal S1, S2 [No Murmur] : no murmur [Normal] : clear lung fields, good air entry, no respiratory distress [Soft] : abdomen soft [Non Tender] : non-tender [No Rash] : no rash [No Edema] : no edema [Moves all extremities] : moves all extremities [Alert and Oriented] : alert and oriented [de-identified] : walking with cane

## 2022-03-21 DIAGNOSIS — E78.49 OTHER HYPERLIPIDEMIA: ICD-10-CM

## 2022-03-21 LAB
ALBUMIN SERPL ELPH-MCNC: 4.6 G/DL
ALP BLD-CCNC: 151 U/L
ALT SERPL-CCNC: 11 U/L
ANION GAP SERPL CALC-SCNC: 12 MMOL/L
AST SERPL-CCNC: 16 U/L
BASOPHILS # BLD AUTO: 0.03 K/UL
BASOPHILS NFR BLD AUTO: 0.6 %
BILIRUB SERPL-MCNC: 0.2 MG/DL
BUN SERPL-MCNC: 23 MG/DL
CALCIUM SERPL-MCNC: 10.2 MG/DL
CHLORIDE SERPL-SCNC: 103 MMOL/L
CHOLEST SERPL-MCNC: 296 MG/DL
CO2 SERPL-SCNC: 26 MMOL/L
CREAT SERPL-MCNC: 0.7 MG/DL
EGFR: 92 ML/MIN/1.73M2
EOSINOPHIL # BLD AUTO: 0.16 K/UL
EOSINOPHIL NFR BLD AUTO: 3 %
GLUCOSE SERPL-MCNC: 87 MG/DL
HCT VFR BLD CALC: 43.3 %
HDLC SERPL-MCNC: 63 MG/DL
HGB BLD-MCNC: 13.1 G/DL
IMM GRANULOCYTES NFR BLD AUTO: 0.2 %
LDLC SERPL CALC-MCNC: 206 MG/DL
LYMPHOCYTES # BLD AUTO: 1.8 K/UL
LYMPHOCYTES NFR BLD AUTO: 33.5 %
MAN DIFF?: NORMAL
MCHC RBC-ENTMCNC: 28 PG
MCHC RBC-ENTMCNC: 30.3 GM/DL
MCV RBC AUTO: 92.5 FL
MONOCYTES # BLD AUTO: 0.52 K/UL
MONOCYTES NFR BLD AUTO: 9.7 %
NEUTROPHILS # BLD AUTO: 2.85 K/UL
NEUTROPHILS NFR BLD AUTO: 53 %
NONHDLC SERPL-MCNC: 233 MG/DL
PLATELET # BLD AUTO: 340 K/UL
POTASSIUM SERPL-SCNC: 5.2 MMOL/L
PROT SERPL-MCNC: 7 G/DL
RBC # BLD: 4.68 M/UL
RBC # FLD: 13 %
SODIUM SERPL-SCNC: 141 MMOL/L
TRIGL SERPL-MCNC: 137 MG/DL
TSH SERPL-ACNC: 2.67 UIU/ML
WBC # FLD AUTO: 5.37 K/UL

## 2022-03-21 RX ORDER — CELECOXIB 200 MG/1
200 CAPSULE ORAL TWICE DAILY
Qty: 60 | Refills: 2 | Status: DISCONTINUED | COMMUNITY
Start: 2022-01-27 | End: 2022-03-21

## 2022-03-21 RX ORDER — ATORVASTATIN CALCIUM 40 MG/1
40 TABLET, FILM COATED ORAL DAILY
Qty: 30 | Refills: 6 | Status: ACTIVE | COMMUNITY
Start: 2022-03-21 | End: 1900-01-01

## 2022-03-23 ENCOUNTER — NON-APPOINTMENT (OUTPATIENT)
Age: 72
End: 2022-03-23

## 2022-05-09 ENCOUNTER — RESULT REVIEW (OUTPATIENT)
Age: 72
End: 2022-05-09

## 2022-05-09 ENCOUNTER — OUTPATIENT (OUTPATIENT)
Dept: OUTPATIENT SERVICES | Facility: HOSPITAL | Age: 72
LOS: 1 days | End: 2022-05-09
Payer: MEDICARE

## 2022-05-09 DIAGNOSIS — R07.9 CHEST PAIN, UNSPECIFIED: ICD-10-CM

## 2022-05-09 DIAGNOSIS — Z98.891 HISTORY OF UTERINE SCAR FROM PREVIOUS SURGERY: Chronic | ICD-10-CM

## 2022-05-09 PROCEDURE — 93018 CV STRESS TEST I&R ONLY: CPT

## 2022-05-09 PROCEDURE — 78452 HT MUSCLE IMAGE SPECT MULT: CPT

## 2022-05-09 PROCEDURE — 78452 HT MUSCLE IMAGE SPECT MULT: CPT | Mod: 26

## 2022-05-09 PROCEDURE — 93017 CV STRESS TEST TRACING ONLY: CPT

## 2022-05-09 PROCEDURE — 93016 CV STRESS TEST SUPVJ ONLY: CPT

## 2022-05-09 PROCEDURE — A9505: CPT

## 2022-05-09 PROCEDURE — A9500: CPT

## 2022-05-24 ENCOUNTER — OUTPATIENT (OUTPATIENT)
Dept: OUTPATIENT SERVICES | Facility: HOSPITAL | Age: 72
LOS: 1 days | End: 2022-05-24
Payer: MEDICARE

## 2022-05-24 ENCOUNTER — RESULT REVIEW (OUTPATIENT)
Age: 72
End: 2022-05-24

## 2022-05-24 ENCOUNTER — APPOINTMENT (OUTPATIENT)
Dept: ORTHOPEDIC SURGERY | Facility: CLINIC | Age: 72
End: 2022-05-24
Payer: MEDICARE

## 2022-05-24 DIAGNOSIS — Z98.891 HISTORY OF UTERINE SCAR FROM PREVIOUS SURGERY: Chronic | ICD-10-CM

## 2022-05-24 DIAGNOSIS — Z96.652 AFTERCARE FOLLOWING JOINT REPLACEMENT SURGERY: ICD-10-CM

## 2022-05-24 DIAGNOSIS — Z47.1 AFTERCARE FOLLOWING JOINT REPLACEMENT SURGERY: ICD-10-CM

## 2022-05-24 PROCEDURE — 73564 X-RAY EXAM KNEE 4 OR MORE: CPT | Mod: 26,LT,RT

## 2022-05-24 PROCEDURE — 99214 OFFICE O/P EST MOD 30 MIN: CPT

## 2022-05-24 PROCEDURE — 73564 X-RAY EXAM KNEE 4 OR MORE: CPT

## 2022-05-30 PROBLEM — Z47.1 AFTERCARE FOLLOWING LEFT KNEE JOINT REPLACEMENT SURGERY: Status: ACTIVE | Noted: 2022-01-27

## 2022-05-30 NOTE — DISCUSSION/SUMMARY
[de-identified] : 71 y/o female with well-functioning L TKA, right knee osteoarthritis\par - Proceed with R TKA as scheduled\par - Cont HEP

## 2022-05-30 NOTE — HISTORY OF PRESENT ILLNESS
[de-identified] : L TKA 1/28/22\par \par 5/24/22: Now nearly 4mo s/p L TKA, doing well. Following up to review progress on preparations for R TKA. Reports that she underwent a clearance stress test with normal results. Surgery scheduled for June. Would like to proceed as planned.\par \par 12/8/21: 72y/o Kazakh speaking female here with daughter Conchis, ref by Dr. Bunch for evaluation of bilateral knee osteoarthritis. Left is consistently more symptomatic than the right. Progressive pain, swelling, deformity, and loss of function for the past 6 years. Treatments attempted thus far include PT, HEP, activity modification, Tylenol, various NSAIDs, CSI, and PRP. Pain at this point is severe every day and limits her from ambulating any more than necessary for basic necessities. Lives with an adult child; has three steps to manage at home. Can walk about 5-10min with cane. The child does all the IADLs. Here to discuss TKA.\par \par PMH sig only for HLD.

## 2022-05-30 NOTE — PHYSICAL EXAM
[de-identified] : General appearance: well nourished and hydrated, pleasant, alert and oriented x 3, cooperative.  \par HEENT: normocephalic, EOM intact, wearing mask, external auditory canal clear.  \par Cardiovascular: no lower leg edema, no varicosities, dorsalis pedis pulses palpable and symmetric.  \par Lymphatics: no palpable lymphadenopathy, no lymphedema.  \par Neurologic: sensation is normal, no muscle weakness in upper or lower extremities, patella tendon reflexes present and symmetric.  \par Dermatologic: skin moist, warm, no rash.  \par Spine: cervical spine with normal lordosis and painless range of motion, thoracic spine with normal kyphosis and painless range of motion, lumbosacral spine with normal lordosis and painless range of motion. \par Gait: slow to stand and get started. RIght antalgic with varus thrust.\par \par Left knee:\par - Soft tissue swelling: mild\par - Ecchymosis: none\par - Erythema: none\par - Effusion: small\par - Wounds: healed midline incision\par - Alignment: normal\par - Tenderness: mild milena-incisional\par - ROM: 0-120\par - Collateral laxity: none\par - Cruciate laxity: none\par - Popliteal angle (degrees): 30\par - Quad strength: 5/5\par \par Right knee:\par - Soft tissue swelling: mild\par - Ecchymosis: none\par - Erythema: none\par - Effusion: small\par - Wounds: none\par - Alignment: minimally correctable varus\par - Tenderness: circumferential\par - ROM: 0-125\par - Collateral laxity: none\par - Cruciate laxity: none\par - Popliteal angle (degrees): 30\par - Quad strength: 5/5 [de-identified] : 4 views of the bilateral knees (weightbearing AP, weightbearing Dawson, weightbearing lateral, and Sunrise) were interpreted by me and reviewed with the patient.\par \par Location of imaging: [facility]\par Date of exam: 5/25/22\par \par Right knee --\par Alignment: varus with lateral tibial subluxation\par Arthritis: severe tricompartmental worst medial, KL4\par Patellar height: normal\par Patellar tracking: central\par \par Left knee -- TKA in position. All components appear well fixed in good position without evidence of mechanical complication. Patella sits at appropriate height and tracks centrally with lateral tilt.

## 2022-06-17 ENCOUNTER — NON-APPOINTMENT (OUTPATIENT)
Age: 72
End: 2022-06-17

## 2022-06-17 ENCOUNTER — APPOINTMENT (OUTPATIENT)
Dept: HEART AND VASCULAR | Facility: CLINIC | Age: 72
End: 2022-06-17
Payer: MEDICARE

## 2022-06-17 VITALS
SYSTOLIC BLOOD PRESSURE: 132 MMHG | OXYGEN SATURATION: 98 % | HEIGHT: 58 IN | WEIGHT: 204 LBS | BODY MASS INDEX: 42.82 KG/M2 | DIASTOLIC BLOOD PRESSURE: 84 MMHG | TEMPERATURE: 97.3 F | HEART RATE: 73 BPM

## 2022-06-17 DIAGNOSIS — Z01.810 ENCOUNTER FOR PREPROCEDURAL CARDIOVASCULAR EXAMINATION: ICD-10-CM

## 2022-06-17 DIAGNOSIS — E78.00 PURE HYPERCHOLESTEROLEMIA, UNSPECIFIED: ICD-10-CM

## 2022-06-17 DIAGNOSIS — I48.91 UNSPECIFIED ATRIAL FIBRILLATION: ICD-10-CM

## 2022-06-17 DIAGNOSIS — Z00.00 ENCOUNTER FOR GENERAL ADULT MEDICAL EXAMINATION W/OUT ABNORMAL FINDINGS: ICD-10-CM

## 2022-06-17 PROCEDURE — 99214 OFFICE O/P EST MOD 30 MIN: CPT | Mod: 25

## 2022-06-17 PROCEDURE — 93000 ELECTROCARDIOGRAM COMPLETE: CPT | Mod: NC

## 2022-06-17 PROCEDURE — 36415 COLL VENOUS BLD VENIPUNCTURE: CPT

## 2022-06-17 RX ORDER — APIXABAN 5 MG/1
5 TABLET, FILM COATED ORAL
Qty: 60 | Refills: 6 | Status: DISCONTINUED | COMMUNITY
Start: 2022-03-21 | End: 2022-06-17

## 2022-06-17 RX ORDER — ACETAMINOPHEN 500 MG/1
500 TABLET ORAL
Qty: 180 | Refills: 2 | Status: DISCONTINUED | COMMUNITY
Start: 2022-01-27 | End: 2022-06-17

## 2022-06-17 RX ORDER — OXYCODONE 5 MG/1
5 TABLET ORAL
Qty: 50 | Refills: 0 | Status: DISCONTINUED | COMMUNITY
Start: 2022-01-27 | End: 2022-06-17

## 2022-06-17 NOTE — HISTORY OF PRESENT ILLNESS
[FreeTextEntry1] : Ms. Jacobo is a 71yo W with obesity, HL who was admitted in 2022 for left TKR and found to have post-op atrial fibrillation and is here for follow-up and pre-op eval. \par \par Prydeinig speaking, here with son who is translating per pt preference. \par \par Last seen in 2022. At that time, ordered for pharm nuc stress due to chest pain. Labs showed LDL severely elevated -- had been off statin. Higher dose of atorvastatin reordered. Was told to f/u with PCP for elevated alk phos. Risk/benefits of a/c were discussed ([id number] 2 for female/age). 30d monitor ordered but did not have done. \par \par Since then:\par -planned for rt knee surgery  Dr Robbin Koehler. Is able to walk 2 block without chest pain or dyspnea. Limited by knee pain. \par Patient denies any chest pain, SOB, palpitations, orthopnea, PND or LE edema.\par \par Post-op Afib:\par -she stopped her apixaban -- does not want to continue taking\par -did not want to do 30d monitor to assess for recurrence\par -denies palpitations\par \par HL: restarted atorvastatin at last visit but then stopped earlier this month, son asked her to restart\par -is tolerating\par \par No issues with anesthesia\par Mobility limited so not as physically active\par Walks with a cane\par \par 2022 Labs:\par Chol 296\par HDL 63\par Trig 137\par \par Alk Phos 151\par AST/ALT wnl\par TSH 2.67\par \par Prior hx:\par Admitted in 2022. Postop had afib with RVR, was treated with IV metoprolol (no EKGs available to review). Then converted to NSR. Had TTE done. TSH mildly elevated. \par \par Primary Care: Trinh Jimenez\par \par Has history of gastritis -- got medication that helped\par \par 2022 Labs:\par Cr 0.61\par TSH 5.7\par \par PMH/PSH:\par as above; also\par OA\par osteopenia\par gastritis\par \par \par FH:\par denies history of heart disease\par \par ALL:\par NKDA\par \par ROS:\par No fever/chills\par No nausea/vomiting\par \par Lifestyle History:\par Mediterranean Diet Score (9 question survey) was 7\par (8-9: optimal, 6-7: near-optimal, 4-5: suboptimal, 0-3: markedly suboptimal)\par Exercise: Limited mobility, uses cane\par Smoking: Never smoker\par Does not know about snoring, witnessed apnea episodes; no excessive daytime fatigue\par \par No PCOS\par 9 pregnancies\par no pregnancy complications\par no miscarriages\par menopause age 52\par no HRT

## 2022-06-17 NOTE — PHYSICAL EXAM
[Normal Venous Pressure] : normal venous pressure [___+] : [unfilled]U+ pretibial pitting edema on the left [Normal] : clear lung fields, good air entry, no respiratory distress [Soft] : abdomen soft [Moves all extremities] : moves all extremities [Alert and Oriented] : alert and oriented [Non Tender] : non-tender [No Edema] : no edema [de-identified] : using cane

## 2022-06-17 NOTE — CARDIOLOGY SUMMARY
[de-identified] : \par 6/17/22 EKG: NSR, nl axis, nl intervals, borderline ST depression nondiagnostic\par 3/18/22 EKG: SR, borderline LAE \par  [de-identified] : \par 5/9/22 Pharm Nuc Stress: normal myocardial perfusion imaging. Normal LV fxn, no rwma [de-identified] : \par 1/31/22 TTE: normal LV and RV size and function, no sig valvular disease, no pericardial effusion

## 2022-06-17 NOTE — DISCUSSION/SUMMARY
[FreeTextEntry1] : Ms. Jacobo is a 71yo W with obesity, HL who was admitted in Jan 2022 for left TKR and found to have post-op atrial fibrillation and is here for follow-up and pre-op eval. \par \par Pre Op: Optimized from cardiac standpoint for surgery. ECG reviewed - in SR. Labs ordered as requested by patient/son for preop. \par Normal LV function earlier this year. Nuclear stress last month with no ischemia.\par No active cardiac conditions. No cardiac contraindications to planned surgery. Elevated risk but undergoing surgery is not high risk. \par Previous surgery no issues with anesthesia but had post-op afib -- would advise close monitoring post-op and obtain postop ECG \par \par Afib in setting of prior knee surgery:\par -patient stopped anticoagulation -- have discussed in past risk/benefit and pt preferred not to continue taking\par -technically if was short duration and confined to post-op period, would not necessarily need anticoag but as details unclear and if tolerated without bleeding issues, had favored her taking it but per son she decided to stop. She does not want to pursue 30d monitor as was discussed in the past. \par \par HLD: \par -restarted on statin at last visit\par -check lipid profile today, goal LDL <100. \par \par Follow-up 3 months. Patient interested in further discussion re: weight loss post-surgery.

## 2022-06-17 NOTE — REASON FOR VISIT
[CV Risk Factors and Non-Cardiac Disease] : CV risk factors and non-cardiac disease [FreeTextEntry3] : Dr. Robbin Koehler, Dr. Harshal Jimenez

## 2022-06-17 NOTE — END OF VISIT
[] : Fellow [FreeTextEntry3] : Patient seen and examined. Case discussed with preventive cardiology fellow. Agree with plan as detailed above.

## 2022-06-20 LAB
ALBUMIN SERPL ELPH-MCNC: 4.4 G/DL
ALP BLD-CCNC: 147 U/L
ALT SERPL-CCNC: 12 U/L
ANION GAP SERPL CALC-SCNC: 11 MMOL/L
APPEARANCE: CLEAR
APTT BLD: 32.3 SEC
AST SERPL-CCNC: 17 U/L
BASOPHILS # BLD AUTO: 0.03 K/UL
BASOPHILS NFR BLD AUTO: 0.6 %
BILIRUB SERPL-MCNC: 0.3 MG/DL
BILIRUBIN URINE: NEGATIVE
BLOOD URINE: NEGATIVE
BUN SERPL-MCNC: 14 MG/DL
CALCIUM SERPL-MCNC: 9.7 MG/DL
CHLORIDE SERPL-SCNC: 104 MMOL/L
CHOLEST SERPL-MCNC: 281 MG/DL
CO2 SERPL-SCNC: 26 MMOL/L
COLOR: NORMAL
CREAT SERPL-MCNC: 0.65 MG/DL
EGFR: 93 ML/MIN/1.73M2
EOSINOPHIL # BLD AUTO: 0.12 K/UL
EOSINOPHIL NFR BLD AUTO: 2.5 %
ESTIMATED AVERAGE GLUCOSE: 126 MG/DL
GLUCOSE QUALITATIVE U: NEGATIVE
GLUCOSE SERPL-MCNC: 98 MG/DL
HBA1C MFR BLD HPLC: 6 %
HCT VFR BLD CALC: 43.9 %
HDLC SERPL-MCNC: 58 MG/DL
HGB BLD-MCNC: 13.8 G/DL
IMM GRANULOCYTES NFR BLD AUTO: 0.4 %
INR PPP: 0.95 RATIO
KETONES URINE: NEGATIVE
LDLC SERPL CALC-MCNC: 186 MG/DL
LEUKOCYTE ESTERASE URINE: NEGATIVE
LYMPHOCYTES # BLD AUTO: 1.31 K/UL
LYMPHOCYTES NFR BLD AUTO: 27.8 %
MAN DIFF?: NORMAL
MCHC RBC-ENTMCNC: 27.1 PG
MCHC RBC-ENTMCNC: 31.4 GM/DL
MCV RBC AUTO: 86.1 FL
MONOCYTES # BLD AUTO: 0.38 K/UL
MONOCYTES NFR BLD AUTO: 8.1 %
NEUTROPHILS # BLD AUTO: 2.85 K/UL
NEUTROPHILS NFR BLD AUTO: 60.6 %
NITRITE URINE: NEGATIVE
NONHDLC SERPL-MCNC: 223 MG/DL
PH URINE: 8
PLATELET # BLD AUTO: 268 K/UL
POTASSIUM SERPL-SCNC: 4.9 MMOL/L
PROT SERPL-MCNC: 7.2 G/DL
PROTEIN URINE: NEGATIVE
PT BLD: 11 SEC
RBC # BLD: 5.1 M/UL
RBC # FLD: 14.6 %
SODIUM SERPL-SCNC: 140 MMOL/L
SPECIFIC GRAVITY URINE: 1.01
TRIGL SERPL-MCNC: 187 MG/DL
UROBILINOGEN URINE: NORMAL
WBC # FLD AUTO: 4.71 K/UL

## 2022-06-29 ENCOUNTER — TRANSCRIPTION ENCOUNTER (OUTPATIENT)
Age: 72
End: 2022-06-29

## 2022-06-29 VITALS
TEMPERATURE: 97 F | OXYGEN SATURATION: 97 % | HEART RATE: 72 BPM | WEIGHT: 213.85 LBS | RESPIRATION RATE: 16 BRPM | HEIGHT: 58 IN | SYSTOLIC BLOOD PRESSURE: 171 MMHG | DIASTOLIC BLOOD PRESSURE: 86 MMHG

## 2022-06-29 RX ORDER — POVIDONE-IODINE 5 %
1 AEROSOL (ML) TOPICAL ONCE
Refills: 0 | Status: COMPLETED | OUTPATIENT
Start: 2022-06-30 | End: 2022-06-30

## 2022-06-29 RX ORDER — ACETAMINOPHEN 500 MG/1
500 TABLET ORAL
Qty: 180 | Refills: 1 | Status: ACTIVE | COMMUNITY
Start: 2022-06-29 | End: 1900-01-01

## 2022-06-29 RX ORDER — CELECOXIB 200 MG/1
200 CAPSULE ORAL TWICE DAILY
Qty: 60 | Refills: 2 | Status: ACTIVE | COMMUNITY
Start: 2022-06-29 | End: 1900-01-01

## 2022-06-29 RX ORDER — APIXABAN 5 MG/1
5 TABLET, FILM COATED ORAL
Qty: 60 | Refills: 0 | Status: ACTIVE | COMMUNITY
Start: 2022-06-29 | End: 1900-01-01

## 2022-06-29 NOTE — H&P ADULT - NSICDXPASTSURGICALHX_GEN_ALL_CORE_FT
PAST SURGICAL HISTORY:  H/O:       PAST SURGICAL HISTORY:  H/O total knee replacement left    H/O:

## 2022-06-29 NOTE — H&P ADULT - NSHPPHYSICALEXAM_GEN_ALL_CORE
MSK: + decreased ROM 2/2 pain, right knee       Remainder of exam per medical clearance note Gen: 71 y/o, NAD  MSK: Decreased right knee ROM secondary to pain  Skin without erythema, ecchymosis, abrasions or lesions, signs of infection; well healed surgical incision left knee  Calves soft, nontender bilaterally   Sensation intact to light touch bilateral lower extremities  Pulses: DP2+ bilat LE; brisk capillary refill  Quads firing bilat LE  EHL/FHL/TA/GS 5/5 bilaterally     Rest of PE per MD clearance

## 2022-06-29 NOTE — H&P ADULT - NSHPLABSRESULTS_GEN_ALL_CORE
Preop CBC, BMP, PT/PTT/INR, UA - WNL per medical clearance   Cr .65   Preop CXR - WNL per medical clearance   Preop EKG - sinus - WNL per medical clearance   Echo 1/31/22 WNL EF 60-65%   Stress Test 5/9/22 WNL  3M DOS  Covid swab negative 6/27

## 2022-06-29 NOTE — H&P ADULT - HISTORY OF PRESENT ILLNESS
72F c/o right knee pain x   Pt d/c'd Eliquis ___ days pre operatively     Present for elective right total knee replacement, robotic assisted  72F c/o right knee pain x several years. History of L TKR in January without complications though daughter does note postoperative HTN. Patient has tried medications, PT, injections, rest, and time without relief. She currently requires a cane for assistance with ambulation. PMHx of a fib on Eliquis. Reports history of preDM2. Denies DVT/PE, stroke or MI.    Present for elective right total knee replacement, robotic assisted

## 2022-06-29 NOTE — PRE-OP CHECKLIST - SELECT TESTS ORDERED
echo; TTE/BMP/CBC/PT/PTT/INR/Urinalysis/EKG/CXR echo; TTE/BMP/CBC/PT/PTT/INR/Urinalysis/EKG/CXR/COVID-19

## 2022-06-29 NOTE — H&P ADULT - NSICDXPASTMEDICALHX_GEN_ALL_CORE_FT
PAST MEDICAL HISTORY:  Gastritis     Osteoarthritis knee    Osteopenia      PAST MEDICAL HISTORY:  Atrial fibrillation     Gastritis GERD    HLD (hyperlipidemia)     Osteoarthritis knee    Osteopenia

## 2022-06-30 ENCOUNTER — TRANSCRIPTION ENCOUNTER (OUTPATIENT)
Age: 72
End: 2022-06-30

## 2022-06-30 ENCOUNTER — APPOINTMENT (OUTPATIENT)
Dept: ORTHOPEDIC SURGERY | Facility: HOSPITAL | Age: 72
End: 2022-06-30

## 2022-06-30 ENCOUNTER — INPATIENT (INPATIENT)
Facility: HOSPITAL | Age: 72
LOS: 0 days | Discharge: HOME | DRG: 470 | End: 2022-07-01
Attending: ORTHOPAEDIC SURGERY | Admitting: ORTHOPAEDIC SURGERY
Payer: MEDICARE

## 2022-06-30 DIAGNOSIS — E78.5 HYPERLIPIDEMIA, UNSPECIFIED: ICD-10-CM

## 2022-06-30 DIAGNOSIS — M19.90 UNSPECIFIED OSTEOARTHRITIS, UNSPECIFIED SITE: ICD-10-CM

## 2022-06-30 DIAGNOSIS — K29.70 GASTRITIS, UNSPECIFIED, WITHOUT BLEEDING: ICD-10-CM

## 2022-06-30 DIAGNOSIS — Z96.659 PRESENCE OF UNSPECIFIED ARTIFICIAL KNEE JOINT: Chronic | ICD-10-CM

## 2022-06-30 DIAGNOSIS — I48.91 UNSPECIFIED ATRIAL FIBRILLATION: ICD-10-CM

## 2022-06-30 DIAGNOSIS — Z98.891 HISTORY OF UTERINE SCAR FROM PREVIOUS SURGERY: Chronic | ICD-10-CM

## 2022-06-30 LAB
ALBUMIN SERPL ELPH-MCNC: 3.7 G/DL — SIGNIFICANT CHANGE UP (ref 3.3–5)
ALP SERPL-CCNC: 128 U/L — HIGH (ref 40–120)
ALT FLD-CCNC: 14 U/L — SIGNIFICANT CHANGE UP (ref 10–45)
ANION GAP SERPL CALC-SCNC: 11 MMOL/L — SIGNIFICANT CHANGE UP (ref 5–17)
AST SERPL-CCNC: 22 U/L — SIGNIFICANT CHANGE UP (ref 10–40)
BILIRUB SERPL-MCNC: 0.3 MG/DL — SIGNIFICANT CHANGE UP (ref 0.2–1.2)
BUN SERPL-MCNC: 9 MG/DL — SIGNIFICANT CHANGE UP (ref 7–23)
CALCIUM SERPL-MCNC: 8.8 MG/DL — SIGNIFICANT CHANGE UP (ref 8.4–10.5)
CHLORIDE SERPL-SCNC: 101 MMOL/L — SIGNIFICANT CHANGE UP (ref 96–108)
CO2 SERPL-SCNC: 25 MMOL/L — SIGNIFICANT CHANGE UP (ref 22–31)
CREAT SERPL-MCNC: 0.59 MG/DL — SIGNIFICANT CHANGE UP (ref 0.5–1.3)
EGFR: 96 ML/MIN/1.73M2 — SIGNIFICANT CHANGE UP
GLUCOSE BLDC GLUCOMTR-MCNC: 86 MG/DL — SIGNIFICANT CHANGE UP (ref 70–99)
GLUCOSE BLDC GLUCOMTR-MCNC: 89 MG/DL — SIGNIFICANT CHANGE UP (ref 70–99)
GLUCOSE BLDC GLUCOMTR-MCNC: 96 MG/DL — SIGNIFICANT CHANGE UP (ref 70–99)
GLUCOSE SERPL-MCNC: 114 MG/DL — HIGH (ref 70–99)
HCT VFR BLD CALC: 38.2 % — SIGNIFICANT CHANGE UP (ref 34.5–45)
HGB BLD-MCNC: 12.1 G/DL — SIGNIFICANT CHANGE UP (ref 11.5–15.5)
MAGNESIUM SERPL-MCNC: 1.8 MG/DL — SIGNIFICANT CHANGE UP (ref 1.6–2.6)
MCHC RBC-ENTMCNC: 26.7 PG — LOW (ref 27–34)
MCHC RBC-ENTMCNC: 31.7 GM/DL — LOW (ref 32–36)
MCV RBC AUTO: 84.3 FL — SIGNIFICANT CHANGE UP (ref 80–100)
NRBC # BLD: 0 /100 WBCS — SIGNIFICANT CHANGE UP (ref 0–0)
PLATELET # BLD AUTO: 249 K/UL — SIGNIFICANT CHANGE UP (ref 150–400)
POTASSIUM SERPL-MCNC: 4.8 MMOL/L — SIGNIFICANT CHANGE UP (ref 3.5–5.3)
POTASSIUM SERPL-SCNC: 4.8 MMOL/L — SIGNIFICANT CHANGE UP (ref 3.5–5.3)
PROT SERPL-MCNC: 6.7 G/DL — SIGNIFICANT CHANGE UP (ref 6–8.3)
RBC # BLD: 4.53 M/UL — SIGNIFICANT CHANGE UP (ref 3.8–5.2)
RBC # FLD: 14.3 % — SIGNIFICANT CHANGE UP (ref 10.3–14.5)
SODIUM SERPL-SCNC: 137 MMOL/L — SIGNIFICANT CHANGE UP (ref 135–145)
TROPONIN T SERPL-MCNC: 0.01 NG/ML — SIGNIFICANT CHANGE UP (ref 0–0.01)
WBC # BLD: 10.47 K/UL — SIGNIFICANT CHANGE UP (ref 3.8–10.5)
WBC # FLD AUTO: 10.47 K/UL — SIGNIFICANT CHANGE UP (ref 3.8–10.5)

## 2022-06-30 PROCEDURE — 27447 TOTAL KNEE ARTHROPLASTY: CPT | Mod: RT

## 2022-06-30 PROCEDURE — 99221 1ST HOSP IP/OBS SF/LOW 40: CPT

## 2022-06-30 PROCEDURE — 73560 X-RAY EXAM OF KNEE 1 OR 2: CPT | Mod: 26,RT

## 2022-06-30 DEVICE — STEM EXT PERSONA 14MM PLUS 30M: Type: IMPLANTABLE DEVICE | Site: RIGHT | Status: FUNCTIONAL

## 2022-06-30 DEVICE — IMPLANTABLE DEVICE: Type: IMPLANTABLE DEVICE | Site: RIGHT | Status: FUNCTIONAL

## 2022-06-30 DEVICE — FEM PERSONA PS CMT CCR STD SZ 5 R: Type: IMPLANTABLE DEVICE | Site: RIGHT | Status: FUNCTIONAL

## 2022-06-30 DEVICE — PATELLA POLY VE 26MM: Type: IMPLANTABLE DEVICE | Site: RIGHT | Status: FUNCTIONAL

## 2022-06-30 DEVICE — STEM TIBIA PERSONA SZ  5/CR: Type: IMPLANTABLE DEVICE | Site: RIGHT | Status: FUNCTIONAL

## 2022-06-30 DEVICE — ZIMMER/NEXGEN HEX HEAD SCREW 3.5MM: Type: IMPLANTABLE DEVICE | Site: RIGHT | Status: FUNCTIONAL

## 2022-06-30 DEVICE — ZIMMER/NEXGEN SMOOTH PIN 3.2X75MM: Type: IMPLANTABLE DEVICE | Site: RIGHT | Status: FUNCTIONAL

## 2022-06-30 RX ORDER — OMEPRAZOLE 10 MG/1
1 CAPSULE, DELAYED RELEASE ORAL
Qty: 0 | Refills: 0 | DISCHARGE

## 2022-06-30 RX ORDER — CHLORHEXIDINE GLUCONATE 213 G/1000ML
1 SOLUTION TOPICAL ONCE
Refills: 0 | Status: COMPLETED | OUTPATIENT
Start: 2022-06-30 | End: 2022-06-30

## 2022-06-30 RX ORDER — MEPERIDINE HYDROCHLORIDE 50 MG/ML
25 INJECTION INTRAMUSCULAR; INTRAVENOUS; SUBCUTANEOUS ONCE
Refills: 0 | Status: DISCONTINUED | OUTPATIENT
Start: 2022-06-30 | End: 2022-06-30

## 2022-06-30 RX ORDER — ATORVASTATIN CALCIUM 80 MG/1
40 TABLET, FILM COATED ORAL DAILY
Refills: 0 | Status: DISCONTINUED | OUTPATIENT
Start: 2022-06-30 | End: 2022-06-30

## 2022-06-30 RX ORDER — ACETAMINOPHEN 500 MG
1000 TABLET ORAL ONCE
Refills: 0 | Status: COMPLETED | OUTPATIENT
Start: 2022-06-30 | End: 2022-06-30

## 2022-06-30 RX ORDER — FAMOTIDINE 10 MG/ML
20 INJECTION INTRAVENOUS ONCE
Refills: 0 | Status: COMPLETED | OUTPATIENT
Start: 2022-06-30 | End: 2022-06-30

## 2022-06-30 RX ORDER — CEFAZOLIN SODIUM 1 G
2000 VIAL (EA) INJECTION EVERY 8 HOURS
Refills: 0 | Status: COMPLETED | OUTPATIENT
Start: 2022-06-30 | End: 2022-07-01

## 2022-06-30 RX ORDER — METOPROLOL TARTRATE 50 MG
2.54 TABLET ORAL EVERY 8 HOURS
Refills: 0 | Status: DISCONTINUED | OUTPATIENT
Start: 2022-06-30 | End: 2022-06-30

## 2022-06-30 RX ORDER — APREPITANT 80 MG/1
40 CAPSULE ORAL ONCE
Refills: 0 | Status: COMPLETED | OUTPATIENT
Start: 2022-06-30 | End: 2022-06-30

## 2022-06-30 RX ORDER — SODIUM CHLORIDE 9 MG/ML
1000 INJECTION, SOLUTION INTRAVENOUS
Refills: 0 | Status: DISCONTINUED | OUTPATIENT
Start: 2022-07-01 | End: 2022-07-01

## 2022-06-30 RX ORDER — PANTOPRAZOLE SODIUM 20 MG/1
40 TABLET, DELAYED RELEASE ORAL
Refills: 0 | Status: DISCONTINUED | OUTPATIENT
Start: 2022-07-01 | End: 2022-07-01

## 2022-06-30 RX ORDER — ONDANSETRON 8 MG/1
4 TABLET, FILM COATED ORAL EVERY 6 HOURS
Refills: 0 | Status: DISCONTINUED | OUTPATIENT
Start: 2022-06-30 | End: 2022-07-01

## 2022-06-30 RX ORDER — ACETAMINOPHEN 500 MG
975 TABLET ORAL EVERY 8 HOURS
Refills: 0 | Status: DISCONTINUED | OUTPATIENT
Start: 2022-06-30 | End: 2022-07-01

## 2022-06-30 RX ORDER — KETOROLAC TROMETHAMINE 30 MG/ML
15 SYRINGE (ML) INJECTION EVERY 6 HOURS
Refills: 0 | Status: DISCONTINUED | OUTPATIENT
Start: 2022-06-30 | End: 2022-07-01

## 2022-06-30 RX ORDER — MAGNESIUM HYDROXIDE 400 MG/1
30 TABLET, CHEWABLE ORAL DAILY
Refills: 0 | Status: DISCONTINUED | OUTPATIENT
Start: 2022-06-30 | End: 2022-07-01

## 2022-06-30 RX ORDER — POLYETHYLENE GLYCOL 3350 17 G/17G
17 POWDER, FOR SOLUTION ORAL AT BEDTIME
Refills: 0 | Status: DISCONTINUED | OUTPATIENT
Start: 2022-06-30 | End: 2022-07-01

## 2022-06-30 RX ORDER — HYDROMORPHONE HYDROCHLORIDE 2 MG/ML
0.5 INJECTION INTRAMUSCULAR; INTRAVENOUS; SUBCUTANEOUS
Refills: 0 | Status: DISCONTINUED | OUTPATIENT
Start: 2022-06-30 | End: 2022-07-01

## 2022-06-30 RX ORDER — ACETAMINOPHEN 500 MG
975 TABLET ORAL EVERY 8 HOURS
Refills: 0 | Status: DISCONTINUED | OUTPATIENT
Start: 2022-06-30 | End: 2022-06-30

## 2022-06-30 RX ORDER — FERROUS SULFATE 325(65) MG
0 TABLET ORAL
Qty: 0 | Refills: 0 | DISCHARGE

## 2022-06-30 RX ORDER — CELECOXIB 200 MG/1
200 CAPSULE ORAL EVERY 12 HOURS
Refills: 0 | Status: DISCONTINUED | OUTPATIENT
Start: 2022-07-01 | End: 2022-07-01

## 2022-06-30 RX ORDER — PANTOPRAZOLE SODIUM 20 MG/1
40 TABLET, DELAYED RELEASE ORAL ONCE
Refills: 0 | Status: COMPLETED | OUTPATIENT
Start: 2022-06-30 | End: 2022-06-30

## 2022-06-30 RX ORDER — SODIUM CHLORIDE 9 MG/ML
1000 INJECTION, SOLUTION INTRAVENOUS
Refills: 0 | Status: DISCONTINUED | OUTPATIENT
Start: 2022-06-30 | End: 2022-07-01

## 2022-06-30 RX ORDER — ATORVASTATIN CALCIUM 80 MG/1
1 TABLET, FILM COATED ORAL
Qty: 0 | Refills: 0 | DISCHARGE

## 2022-06-30 RX ORDER — DEXTROSE 50 % IN WATER 50 %
15 SYRINGE (ML) INTRAVENOUS ONCE
Refills: 0 | Status: DISCONTINUED | OUTPATIENT
Start: 2022-06-30 | End: 2022-07-01

## 2022-06-30 RX ORDER — APIXABAN 2.5 MG/1
5 TABLET, FILM COATED ORAL
Refills: 0 | Status: DISCONTINUED | OUTPATIENT
Start: 2022-07-01 | End: 2022-07-01

## 2022-06-30 RX ORDER — DEXTROSE 50 % IN WATER 50 %
25 SYRINGE (ML) INTRAVENOUS ONCE
Refills: 0 | Status: DISCONTINUED | OUTPATIENT
Start: 2022-06-30 | End: 2022-07-01

## 2022-06-30 RX ORDER — TRAMADOL HYDROCHLORIDE 50 MG/1
50 TABLET ORAL EVERY 6 HOURS
Refills: 0 | Status: DISCONTINUED | OUTPATIENT
Start: 2022-06-30 | End: 2022-07-01

## 2022-06-30 RX ORDER — PANTOPRAZOLE SODIUM 20 MG/1
40 TABLET, DELAYED RELEASE ORAL
Refills: 0 | Status: DISCONTINUED | OUTPATIENT
Start: 2022-06-30 | End: 2022-06-30

## 2022-06-30 RX ORDER — PANTOPRAZOLE 40 MG/1
40 TABLET, DELAYED RELEASE ORAL
Qty: 30 | Refills: 0 | Status: ACTIVE | COMMUNITY
Start: 2022-06-30 | End: 1900-01-01

## 2022-06-30 RX ORDER — METOPROLOL TARTRATE 50 MG
2.54 TABLET ORAL EVERY 6 HOURS
Refills: 0 | Status: DISCONTINUED | OUTPATIENT
Start: 2022-06-30 | End: 2022-06-30

## 2022-06-30 RX ORDER — OXYCODONE HYDROCHLORIDE 5 MG/1
10 TABLET ORAL EVERY 4 HOURS
Refills: 0 | Status: DISCONTINUED | OUTPATIENT
Start: 2022-06-30 | End: 2022-07-01

## 2022-06-30 RX ORDER — OXYCODONE HYDROCHLORIDE 5 MG/1
5 TABLET ORAL EVERY 4 HOURS
Refills: 0 | Status: DISCONTINUED | OUTPATIENT
Start: 2022-06-30 | End: 2022-07-01

## 2022-06-30 RX ORDER — CELECOXIB 200 MG/1
400 CAPSULE ORAL ONCE
Refills: 0 | Status: COMPLETED | OUTPATIENT
Start: 2022-06-30 | End: 2022-06-30

## 2022-06-30 RX ORDER — ATORVASTATIN CALCIUM 80 MG/1
40 TABLET, FILM COATED ORAL AT BEDTIME
Refills: 0 | Status: DISCONTINUED | OUTPATIENT
Start: 2022-06-30 | End: 2022-07-01

## 2022-06-30 RX ORDER — CALCIUM CARBONATE 500(1250)
2 TABLET ORAL EVERY 6 HOURS
Refills: 0 | Status: DISCONTINUED | OUTPATIENT
Start: 2022-06-30 | End: 2022-07-01

## 2022-06-30 RX ORDER — HYDROMORPHONE HYDROCHLORIDE 2 MG/ML
0.5 INJECTION INTRAMUSCULAR; INTRAVENOUS; SUBCUTANEOUS EVERY 4 HOURS
Refills: 0 | Status: DISCONTINUED | OUTPATIENT
Start: 2022-06-30 | End: 2022-07-01

## 2022-06-30 RX ORDER — INSULIN LISPRO 100/ML
VIAL (ML) SUBCUTANEOUS
Refills: 0 | Status: DISCONTINUED | OUTPATIENT
Start: 2022-06-30 | End: 2022-07-01

## 2022-06-30 RX ORDER — GLUCAGON INJECTION, SOLUTION 0.5 MG/.1ML
1 INJECTION, SOLUTION SUBCUTANEOUS ONCE
Refills: 0 | Status: DISCONTINUED | OUTPATIENT
Start: 2022-06-30 | End: 2022-07-01

## 2022-06-30 RX ORDER — METOPROLOL TARTRATE 50 MG
5 TABLET ORAL ONCE
Refills: 0 | Status: DISCONTINUED | OUTPATIENT
Start: 2022-06-30 | End: 2022-06-30

## 2022-06-30 RX ORDER — OXYCODONE 5 MG/1
5 TABLET ORAL
Qty: 50 | Refills: 0 | Status: ACTIVE | COMMUNITY
Start: 2022-06-30 | End: 1900-01-01

## 2022-06-30 RX ORDER — SENNA PLUS 8.6 MG/1
2 TABLET ORAL AT BEDTIME
Refills: 0 | Status: DISCONTINUED | OUTPATIENT
Start: 2022-06-30 | End: 2022-07-01

## 2022-06-30 RX ORDER — DEXTROSE 50 % IN WATER 50 %
12.5 SYRINGE (ML) INTRAVENOUS ONCE
Refills: 0 | Status: DISCONTINUED | OUTPATIENT
Start: 2022-06-30 | End: 2022-07-01

## 2022-06-30 RX ADMIN — Medication 1000 MILLIGRAM(S): at 12:36

## 2022-06-30 RX ADMIN — Medication 975 MILLIGRAM(S): at 22:30

## 2022-06-30 RX ADMIN — Medication 100 MILLIGRAM(S): at 16:40

## 2022-06-30 RX ADMIN — APREPITANT 40 MILLIGRAM(S): 80 CAPSULE ORAL at 06:52

## 2022-06-30 RX ADMIN — CELECOXIB 400 MILLIGRAM(S): 200 CAPSULE ORAL at 06:53

## 2022-06-30 RX ADMIN — FAMOTIDINE 20 MILLIGRAM(S): 10 INJECTION INTRAVENOUS at 15:59

## 2022-06-30 RX ADMIN — HYDROMORPHONE HYDROCHLORIDE 0.5 MILLIGRAM(S): 2 INJECTION INTRAMUSCULAR; INTRAVENOUS; SUBCUTANEOUS at 15:22

## 2022-06-30 RX ADMIN — Medication 975 MILLIGRAM(S): at 21:44

## 2022-06-30 RX ADMIN — HYDROMORPHONE HYDROCHLORIDE 0.5 MILLIGRAM(S): 2 INJECTION INTRAMUSCULAR; INTRAVENOUS; SUBCUTANEOUS at 15:37

## 2022-06-30 RX ADMIN — POLYETHYLENE GLYCOL 3350 17 GRAM(S): 17 POWDER, FOR SOLUTION ORAL at 21:44

## 2022-06-30 RX ADMIN — CHLORHEXIDINE GLUCONATE 1 APPLICATION(S): 213 SOLUTION TOPICAL at 06:43

## 2022-06-30 RX ADMIN — Medication 1000 MILLIGRAM(S): at 06:52

## 2022-06-30 RX ADMIN — ATORVASTATIN CALCIUM 40 MILLIGRAM(S): 80 TABLET, FILM COATED ORAL at 21:44

## 2022-06-30 RX ADMIN — Medication 30 MILLILITER(S): at 13:15

## 2022-06-30 RX ADMIN — PANTOPRAZOLE SODIUM 40 MILLIGRAM(S): 20 TABLET, DELAYED RELEASE ORAL at 12:20

## 2022-06-30 RX ADMIN — ONDANSETRON 4 MILLIGRAM(S): 8 TABLET, FILM COATED ORAL at 12:20

## 2022-06-30 RX ADMIN — Medication 15 MILLIGRAM(S): at 18:25

## 2022-06-30 RX ADMIN — MEPERIDINE HYDROCHLORIDE 25 MILLIGRAM(S): 50 INJECTION INTRAMUSCULAR; INTRAVENOUS; SUBCUTANEOUS at 12:10

## 2022-06-30 RX ADMIN — Medication 1 APPLICATION(S): at 06:41

## 2022-06-30 RX ADMIN — MEPERIDINE HYDROCHLORIDE 25 MILLIGRAM(S): 50 INJECTION INTRAMUSCULAR; INTRAVENOUS; SUBCUTANEOUS at 11:56

## 2022-06-30 RX ADMIN — Medication 400 MILLIGRAM(S): at 12:21

## 2022-06-30 RX ADMIN — SENNA PLUS 2 TABLET(S): 8.6 TABLET ORAL at 21:44

## 2022-06-30 NOTE — PHYSICAL THERAPY INITIAL EVALUATION ADULT - FOLLOWS COMMANDS/ANSWERS QUESTIONS, REHAB EVAL
PT communicates with pt through  #[id number]/able to follow multistep instructions/able to follow single-step instructions/unable to answer questions

## 2022-06-30 NOTE — CONSULT NOTE ADULT - SUBJECTIVE AND OBJECTIVE BOX
Patient was seen and examined after the surgery. She was lying in bed complaining of abdominal discomfort. She mentioned that she has a lot of gas in her stomch and would like something to help. She started eating soup. She denied any chest pain, SOB, difficulty breathing, palpitation but reported dizziness. She is also complaining of right knee pain after the surgery.     ROS: negative for chest pain, SOB, difficulty breathing,   Positive for abd distension and discomfort Right knee pain.     Physical exam:   General: no distress.   Chest: clear breathing sounds BL, no wheezing or rhonchi.   Heart: RRR, + s1/s2, no murmur or gallops.   Abd: soft, distended but non tender, no guarding or gallops.   Extremity: R knee with vac, no bleeding or discharge.   Neuro: AAO x3.

## 2022-06-30 NOTE — CONSULT NOTE ADULT - ASSESSMENT
73 yo F with PMH of HTN, HLD, presented for right total knee replacement. Pt has Left total knee replacement in January 2022 without any complication. Howevere after the surgery she developed A fib for which she was on blood thinner (not sure about the name) which she stopped one month ago by her cardiologist? Last visit to her cardiologist was last week for pre-surgical optimization.     Today pt underwent right total knee replacement and right after the surgery her HR was in the 140s, medicine consulted for possible Afib with RVR.   No EKG was recorded during the tachycardia event, not sure if it is Afib. On my exam and before the patient receives any medication her HR was 76 with normal sinus rhythm.     # Hx of paroxysmal Afib: currently not on any medication.   # Tachycardia after surgery: unclear if Afib with RVR vs sinus tachycardia. possibly due to stress and pain after surgery.   Continue to monitor HR, would hold off on any medication for now. can give metoprolol 2.5 mg IV for HR>110.   Please obtain EKG for any tachycardia >100.   Pt to follow up with cardiologist after discharge.     # R knee total knee replacement: POD#0   Encourage incentive spirometry.   Physical therapy as tolerated   Pain management and DVT ppx as per ortho team.     # HLD: c/w atorvastatin 40 mg daily  # GERD: c/w pantoprazole 40 mg daily.  73 yo F with PMH of HTN, HLD, presented for right total knee replacement. Pt has Left total knee replacement in January 2022 without any complication. Howevere after the surgery she developed A fib for which she was on blood thinner (not sure about the name) which she stopped one month ago by her cardiologist? Last visit to her cardiologist was last week for pre-surgical optimization.     Today pt underwent right total knee replacement and right after the surgery her HR was in the 140s, medicine consulted for possible Afib with RVR.   No EKG was recorded during the tachycardia event, not sure if it is Afib. On my exam and before the patient receives any medication her HR was 76 with normal sinus rhythm.     # Hx of paroxysmal Afib: currently not on any medication.   # Tachycardia after surgery: unclear if Afib with RVR vs sinus tachycardia since no EKG.   Will start Metorpolol 5 mg oral BID.   Please obtain EKG for any tachycardia >100.   Pt to follow up with cardiologist after discharge.     # R knee total knee replacement: POD#0   Encourage incentive spirometry.   Physical therapy as tolerated   Pain management and DVT ppx as per ortho team.     # HLD: c/w atorvastatin 40 mg daily  # GERD: c/w pantoprazole 40 mg daily.  71 yo F with PMH of HTN, HLD, presented for right total knee replacement. Pt has Left total knee replacement in January 2022 without any complication. Howevere after the surgery she developed A fib for which she was on blood thinner (not sure about the name) which she stopped one month ago by her cardiologist? Last visit to her cardiologist was last week for pre-surgical optimization.     Today pt underwent right total knee replacement and right after the surgery her HR was in the 140s, medicine consulted for possible Afib with RVR.   No EKG was recorded during the tachycardia event, not sure if it is Afib. On my exam and before the patient receives any medication her HR was 76 with normal sinus rhythm.     # Hx of paroxysmal Afib: currently not on any medication.   # Tachycardia after surgery: unclear if Afib with RVR vs sinus tachycardia since no EKG.   Please obtain EKG for any tachycardia >100.   Will order metoprolol 2.5 mg IV q6h PRN for HR>110.   Pt to follow up with cardiologist after discharge.     # R knee total knee replacement: POD#0   Encourage incentive spirometry.   Physical therapy as tolerated   Pain management and DVT ppx as per ortho team.     # HLD: c/w atorvastatin 40 mg daily  # GERD: c/w pantoprazole 40 mg daily.

## 2022-06-30 NOTE — PROVIDER CONTACT NOTE (CHANGE IN STATUS NOTIFICATION) - ASSESSMENT
patient has tremors on upper body, reports difficulty breathing but no labored breathing and pulse ox saturation is 100%. patient has noticalble EKG changes, possible AFIB/A Flutter.

## 2022-06-30 NOTE — PHYSICAL THERAPY INITIAL EVALUATION ADULT - GAIT DEVIATIONS NOTED, PT EVAL
fairly steady gait, no lose of balance,/increased time in double stance/decreased weight-shifting ability

## 2022-06-30 NOTE — PHYSICAL THERAPY INITIAL EVALUATION ADULT - MD/RN NOTIFIED
diffuse abdominal pain ,unable to keep anything down x 2 days , on birth-control pills, smokes marihuana yes

## 2022-06-30 NOTE — CHART NOTE - NSCHARTNOTEFT_GEN_A_CORE
C/o chest pressure, points to epigastric area, also c/o she can't breathe.  RN reports shivering, requesting demerol.  Temp oral 97.4 - requested patient be placed on warming blanket.  Dr. Lopez called and at bedside - demerol given per order.  At this time patient HR increased to 135 and noted to be in AF then returned to NSR 70s.  EKG requested, 1st unable to be read 2/2 shivering, will wait until shivering ceases and RN will attempt 2nd EKG.  Given h/o gastritis and did not take pantoprazole today, will give IVP x 1 dose in PACU.  RR even, appears unlabored, sat 100% on NC.    Above d/w team, will order troponin x 1 - Dr. Lopez aware of plan.  Dispo likely telemetry bed when PACU criteria met.

## 2022-06-30 NOTE — PHYSICAL THERAPY INITIAL EVALUATION ADULT - ADDITIONAL COMMENTS
Pt lives with daughter in an apt with elevator, +ramp to enter. Prior to admission, pt ambulated independently with SC, pt also has rolling walker at home.

## 2022-06-30 NOTE — PROVIDER CONTACT NOTE (CHANGE IN STATUS NOTIFICATION) - SITUATION
patient reports tremors, nausea, difficulty breathing. upon assessment patient has tremors on her upper body, arm and chest. patient says she has difficulty breathing but on monitor patient is 100% saturation on 3L of oxygen.

## 2022-06-30 NOTE — PROVIDER CONTACT NOTE (CHANGE IN STATUS NOTIFICATION) - ACTION/TREATMENT ORDERED:
all due medications given. EKG was unsuccessful on first try but was successful on 2nd attempt. Labs draw. 1345 when RN came back for break patient was seen in bed and reports no distress

## 2022-06-30 NOTE — PHYSICAL THERAPY INITIAL EVALUATION ADULT - IMPAIRMENTS CONTRIBUTING TO GAIT DEVIATIONS, PT EVAL
Ambulation is limited secondary pt complains feeling dizziness/lightheadedness. */impaired balance/pain/impaired postural control/decreased ROM/decreased strength

## 2022-06-30 NOTE — PROVIDER CONTACT NOTE (CHANGE IN STATUS NOTIFICATION) - RECOMMENDATIONS
MIGUEL Manzo ordered the patient for bear hugger. Demerol 25mg IV push ordered. Zofran, Pantoprazole and IV tylenol ordered. EKG, Labs ordered.

## 2022-06-30 NOTE — PHYSICAL THERAPY INITIAL EVALUATION ADULT - GENERAL OBSERVATIONS, REHAB EVAL
Pt received semi supine in bed  with +prevena, Cryocuff,+EKG, +NC~2L, +IV, +b/l SCDs, +ALEJANDRO Kamara. NANDA Guerrero present t/o. Pt left as found, NAD, RN awares., drain and line  intact

## 2022-07-01 ENCOUNTER — TRANSCRIPTION ENCOUNTER (OUTPATIENT)
Age: 72
End: 2022-07-01

## 2022-07-01 VITALS
RESPIRATION RATE: 21 BRPM | OXYGEN SATURATION: 97 % | HEART RATE: 73 BPM | SYSTOLIC BLOOD PRESSURE: 137 MMHG | DIASTOLIC BLOOD PRESSURE: 66 MMHG | TEMPERATURE: 98 F

## 2022-07-01 LAB
A1C WITH ESTIMATED AVERAGE GLUCOSE RESULT: 6.1 % — HIGH (ref 4–5.6)
ANION GAP SERPL CALC-SCNC: 13 MMOL/L — SIGNIFICANT CHANGE UP (ref 5–17)
BUN SERPL-MCNC: 11 MG/DL — SIGNIFICANT CHANGE UP (ref 7–23)
CALCIUM SERPL-MCNC: 9.1 MG/DL — SIGNIFICANT CHANGE UP (ref 8.4–10.5)
CHLORIDE SERPL-SCNC: 102 MMOL/L — SIGNIFICANT CHANGE UP (ref 96–108)
CO2 SERPL-SCNC: 22 MMOL/L — SIGNIFICANT CHANGE UP (ref 22–31)
CREAT SERPL-MCNC: 0.66 MG/DL — SIGNIFICANT CHANGE UP (ref 0.5–1.3)
EGFR: 93 ML/MIN/1.73M2 — SIGNIFICANT CHANGE UP
ESTIMATED AVERAGE GLUCOSE: 128 MG/DL — HIGH (ref 68–114)
GLUCOSE BLDC GLUCOMTR-MCNC: 102 MG/DL — HIGH (ref 70–99)
GLUCOSE SERPL-MCNC: 103 MG/DL — HIGH (ref 70–99)
HCT VFR BLD CALC: 32.9 % — LOW (ref 34.5–45)
HGB BLD-MCNC: 10.3 G/DL — LOW (ref 11.5–15.5)
MCHC RBC-ENTMCNC: 28.1 PG — SIGNIFICANT CHANGE UP (ref 27–34)
MCHC RBC-ENTMCNC: 31.3 GM/DL — LOW (ref 32–36)
MCV RBC AUTO: 89.6 FL — SIGNIFICANT CHANGE UP (ref 80–100)
NRBC # BLD: 0 /100 WBCS — SIGNIFICANT CHANGE UP (ref 0–0)
PLATELET # BLD AUTO: 153 K/UL — SIGNIFICANT CHANGE UP (ref 150–400)
POTASSIUM SERPL-MCNC: 4.5 MMOL/L — SIGNIFICANT CHANGE UP (ref 3.5–5.3)
POTASSIUM SERPL-SCNC: 4.5 MMOL/L — SIGNIFICANT CHANGE UP (ref 3.5–5.3)
RBC # BLD: 3.67 M/UL — LOW (ref 3.8–5.2)
RBC # FLD: 14.6 % — HIGH (ref 10.3–14.5)
SODIUM SERPL-SCNC: 137 MMOL/L — SIGNIFICANT CHANGE UP (ref 135–145)
WBC # BLD: 5.03 K/UL — SIGNIFICANT CHANGE UP (ref 3.8–10.5)
WBC # FLD AUTO: 5.03 K/UL — SIGNIFICANT CHANGE UP (ref 3.8–10.5)

## 2022-07-01 PROCEDURE — 97162 PT EVAL MOD COMPLEX 30 MIN: CPT

## 2022-07-01 PROCEDURE — C1776: CPT

## 2022-07-01 PROCEDURE — C1713: CPT

## 2022-07-01 PROCEDURE — 99233 SBSQ HOSP IP/OBS HIGH 50: CPT

## 2022-07-01 PROCEDURE — 97110 THERAPEUTIC EXERCISES: CPT

## 2022-07-01 PROCEDURE — 80053 COMPREHEN METABOLIC PANEL: CPT

## 2022-07-01 PROCEDURE — 36415 COLL VENOUS BLD VENIPUNCTURE: CPT

## 2022-07-01 PROCEDURE — 83735 ASSAY OF MAGNESIUM: CPT

## 2022-07-01 PROCEDURE — 97116 GAIT TRAINING THERAPY: CPT

## 2022-07-01 PROCEDURE — 80048 BASIC METABOLIC PNL TOTAL CA: CPT

## 2022-07-01 PROCEDURE — 73560 X-RAY EXAM OF KNEE 1 OR 2: CPT

## 2022-07-01 PROCEDURE — 82962 GLUCOSE BLOOD TEST: CPT

## 2022-07-01 PROCEDURE — 84484 ASSAY OF TROPONIN QUANT: CPT

## 2022-07-01 PROCEDURE — 83036 HEMOGLOBIN GLYCOSYLATED A1C: CPT

## 2022-07-01 PROCEDURE — 85027 COMPLETE CBC AUTOMATED: CPT

## 2022-07-01 RX ORDER — APIXABAN 2.5 MG/1
1 TABLET, FILM COATED ORAL
Qty: 0 | Refills: 0 | DISCHARGE
Start: 2022-07-01

## 2022-07-01 RX ORDER — OXYCODONE HYDROCHLORIDE 5 MG/1
1 TABLET ORAL
Qty: 0 | Refills: 0 | DISCHARGE
Start: 2022-07-01

## 2022-07-01 RX ORDER — CELECOXIB 200 MG/1
1 CAPSULE ORAL
Qty: 0 | Refills: 0 | DISCHARGE
Start: 2022-07-01

## 2022-07-01 RX ORDER — ACETAMINOPHEN 500 MG
2 TABLET ORAL
Qty: 0 | Refills: 0 | DISCHARGE
Start: 2022-07-01

## 2022-07-01 RX ADMIN — APIXABAN 5 MILLIGRAM(S): 2.5 TABLET, FILM COATED ORAL at 06:36

## 2022-07-01 RX ADMIN — PANTOPRAZOLE SODIUM 40 MILLIGRAM(S): 20 TABLET, DELAYED RELEASE ORAL at 06:37

## 2022-07-01 RX ADMIN — OXYCODONE HYDROCHLORIDE 10 MILLIGRAM(S): 5 TABLET ORAL at 11:07

## 2022-07-01 RX ADMIN — OXYCODONE HYDROCHLORIDE 10 MILLIGRAM(S): 5 TABLET ORAL at 15:33

## 2022-07-01 RX ADMIN — Medication 975 MILLIGRAM(S): at 13:46

## 2022-07-01 RX ADMIN — Medication 100 MILLIGRAM(S): at 00:24

## 2022-07-01 RX ADMIN — Medication 975 MILLIGRAM(S): at 07:40

## 2022-07-01 RX ADMIN — Medication 975 MILLIGRAM(S): at 06:37

## 2022-07-01 RX ADMIN — Medication 15 MILLIGRAM(S): at 00:50

## 2022-07-01 RX ADMIN — OXYCODONE HYDROCHLORIDE 10 MILLIGRAM(S): 5 TABLET ORAL at 10:37

## 2022-07-01 RX ADMIN — CELECOXIB 200 MILLIGRAM(S): 200 CAPSULE ORAL at 06:37

## 2022-07-01 RX ADMIN — Medication 15 MILLIGRAM(S): at 00:24

## 2022-07-01 RX ADMIN — Medication 1 TABLET(S): at 13:16

## 2022-07-01 RX ADMIN — Medication 975 MILLIGRAM(S): at 13:16

## 2022-07-01 RX ADMIN — MAGNESIUM HYDROXIDE 30 MILLILITER(S): 400 TABLET, CHEWABLE ORAL at 11:35

## 2022-07-01 RX ADMIN — CELECOXIB 200 MILLIGRAM(S): 200 CAPSULE ORAL at 07:20

## 2022-07-01 RX ADMIN — Medication 15 MILLIGRAM(S): at 06:36

## 2022-07-01 RX ADMIN — Medication 15 MILLIGRAM(S): at 07:05

## 2022-07-01 NOTE — DISCHARGE NOTE PROVIDER - NSDCMRMEDTOKEN_GEN_ALL_CORE_FT
acetaminophen 500 mg oral tablet: 2 tab(s) orally every 6 hours, as needed for mild pain  apixaban 5 mg oral tablet: 1 tab(s) orally 2 times a day  atorvastatin 40 mg oral tablet: 1 tab(s) orally once a day  celecoxib 200 mg oral capsule: 1 cap(s) orally every 12 hours  ferrous sulfate 324 mg (65 mg elemental iron) oral tablet: orally once a day  omeprazole 40 mg oral delayed release capsule: 1 cap(s) orally once a day  oxyCODONE 5 mg oral tablet: 1-2 tab(s) orally every 4-6 hours, as needed for moderate to severe pain

## 2022-07-01 NOTE — DISCHARGE NOTE PROVIDER - NSDCFUSCHEDAPPT_GEN_ALL_CORE_FT
Robbin Koehler  [facility] Physician [facility]  ORTHOSURG [address]  Scheduled Appointment: 07/20/2022

## 2022-07-01 NOTE — DISCHARGE NOTE NURSING/CASE MANAGEMENT/SOCIAL WORK - PATIENT PORTAL LINK FT
You can access the FollowMyHealth Patient Portal offered by [facility] by registering at the following website: [email or web address]. By joining [facility]’s FollowMyHealth portal, you will also be able to view your health information using other applications (apps) compatible with our system.

## 2022-07-01 NOTE — DISCHARGE NOTE PROVIDER - NSDCFUADDINST_GEN_ALL_CORE_FT
** PLEASE SEE DR. ROBERSON'S SEPARATE DISCHARGE INSTRUCTIONS SHEET. TAKE MEDICATIONS AS PRESCRIBED BY DR. ROBERSON**    Weight bear as tolerated with assistive device.  No strenuous activity, heavy lifting, driving or returning to work until cleared by MD.  You may shower - dressing is water-resistant, no soaking in bathtubs.  You have a prevena dressing  You may take showers. Keep the battery pack dry.   No soaking in bathtubs.  The dressing has a battery that usually dies in 7 days. Once this occurs, you may remove the dressing and dispose of it, then leave incision open to air. Keep incision clean and dry.      Try to have regular bowel movements, take stool softener or laxative if necessary.    Swelling may travel all the way down leg to foot, this is normal and will subside in a few weeks.  Call to schedule an appt with Dr. Roberson for follow up, if you have staples or sutures they will be removed in office.  Contact your doctor if you experience: fever greater than 101.5, chills, chest pain, difficulty breathing, redness or excessive drainage around the incision, other concerns.  Follow up with your primary care provider.

## 2022-07-01 NOTE — DISCHARGE NOTE PROVIDER - CARE PROVIDER_API CALL
Robbin Koehler)  Orthopedics  [address] [facility], [place] [address]  Phone: [phone]  Fax: [phone]  Follow Up Time: 2 weeks

## 2022-07-01 NOTE — DISCHARGE NOTE PROVIDER - NSDCCPCAREPLAN_GEN_ALL_CORE_FT
10
PRINCIPAL DISCHARGE DIAGNOSIS  Diagnosis: Osteoarthritis  Assessment and Plan of Treatment: right total knee replacement

## 2022-07-01 NOTE — DISCHARGE NOTE NURSING/CASE MANAGEMENT/SOCIAL WORK - NSDCPEFALRISK_GEN_ALL_CORE
For information on Fall & Injury Prevention, visit: [email or web address] OR  [email or web address] OR  https://www.cdc.gov/steadi/patient.html

## 2022-07-01 NOTE — PROGRESS NOTE ADULT - REASON FOR ADMISSION
right total knee replacement

## 2022-07-01 NOTE — DISCHARGE NOTE PROVIDER - HOSPITAL COURSE
Admitted 6/30/22  Surgery- right total knee replacement  Corrine-op Antibiotics  Pain control  DVT prophylaxis  OOB/Physical Therapy

## 2022-07-01 NOTE — PROGRESS NOTE ADULT - ASSESSMENT
72F w h/o HTN, HLD, AF on AC w h/o post-op AF w RVR, R knee OA here for elective R TKR w Dr. Koehler 6/30, for HPT    #Post-op state - pain controlled. PPx: Eliquis. On bowel regimen and incentive spirometer  #AF - rate controlled not on medications. c/w AC  #HLD - c/w atorvastatin 40  #GERD - c/w PPI  #R knee OA - mgmt per ortho  #Pre-DM A1C 6.1. F/U outpatient  #Morbid obesity - 44.7. Affects all aspects of care.  #Normocytic anemia - 10.3 from 12.1. Appears asymptomatic    Recommend  Optimized for dc - Home w HPT

## 2022-07-07 DIAGNOSIS — K21.9 GASTRO-ESOPHAGEAL REFLUX DISEASE WITHOUT ESOPHAGITIS: ICD-10-CM

## 2022-07-07 DIAGNOSIS — R73.03 PREDIABETES: ICD-10-CM

## 2022-07-07 DIAGNOSIS — E66.01 MORBID (SEVERE) OBESITY DUE TO EXCESS CALORIES: ICD-10-CM

## 2022-07-07 DIAGNOSIS — I48.91 UNSPECIFIED ATRIAL FIBRILLATION: ICD-10-CM

## 2022-07-07 DIAGNOSIS — G89.18 OTHER ACUTE POSTPROCEDURAL PAIN: ICD-10-CM

## 2022-07-07 DIAGNOSIS — M17.11 UNILATERAL PRIMARY OSTEOARTHRITIS, RIGHT KNEE: ICD-10-CM

## 2022-07-07 DIAGNOSIS — D62 ACUTE POSTHEMORRHAGIC ANEMIA: ICD-10-CM

## 2022-07-07 DIAGNOSIS — I10 ESSENTIAL (PRIMARY) HYPERTENSION: ICD-10-CM

## 2022-07-07 DIAGNOSIS — E78.5 HYPERLIPIDEMIA, UNSPECIFIED: ICD-10-CM

## 2022-07-20 ENCOUNTER — APPOINTMENT (OUTPATIENT)
Dept: ORTHOPEDIC SURGERY | Facility: CLINIC | Age: 72
End: 2022-07-20

## 2022-07-20 VITALS — DIASTOLIC BLOOD PRESSURE: 83 MMHG | SYSTOLIC BLOOD PRESSURE: 141 MMHG

## 2022-07-20 DIAGNOSIS — M17.11 UNILATERAL PRIMARY OSTEOARTHRITIS, RIGHT KNEE: ICD-10-CM

## 2022-07-20 PROBLEM — E78.5 HYPERLIPIDEMIA, UNSPECIFIED: Chronic | Status: ACTIVE | Noted: 2022-06-30

## 2022-07-20 PROBLEM — K29.70 GASTRITIS, UNSPECIFIED, WITHOUT BLEEDING: Chronic | Status: ACTIVE | Noted: 2022-01-28

## 2022-07-20 PROBLEM — I48.91 UNSPECIFIED ATRIAL FIBRILLATION: Chronic | Status: ACTIVE | Noted: 2022-06-30

## 2022-07-20 PROCEDURE — 99024 POSTOP FOLLOW-UP VISIT: CPT

## 2022-07-20 NOTE — HISTORY OF PRESENT ILLNESS
[de-identified] : First post op right total knee replacement. Surgical date 6/30/22 [de-identified] : Has been doing ok at home. Has been avoiding all pain medications for fear of incurring belly pain and does note moderate pain at all times. Has been participating in [facility] and is about to have her last session tomorrow. Ambulating with cane. No wound or systemic issues. [de-identified] : R knee:\par - Incision healed\par - Evidence of resolved blood blistering medial and lateral to incision, now dry and flat\par - No cellulitis\par - ROM 0-90\par - Ligaments stable\par - Quad strength 5/5\par - Mild antalgia with cane [de-identified] : 71y/o female about 3wk s/p R TKA, doing well [de-identified] : Transition to OPT\par Recommended that she use Tylenol as needed for pain\par RTC next month with repeat right knee XRs

## 2022-08-09 ENCOUNTER — RESULT REVIEW (OUTPATIENT)
Age: 72
End: 2022-08-09

## 2022-08-09 ENCOUNTER — APPOINTMENT (OUTPATIENT)
Dept: ORTHOPEDIC SURGERY | Facility: CLINIC | Age: 72
End: 2022-08-09

## 2022-08-09 ENCOUNTER — OUTPATIENT (OUTPATIENT)
Dept: OUTPATIENT SERVICES | Facility: HOSPITAL | Age: 72
LOS: 1 days | End: 2022-08-09
Payer: MEDICARE

## 2022-08-09 VITALS — HEART RATE: 98 BPM | OXYGEN SATURATION: 98 % | SYSTOLIC BLOOD PRESSURE: 137 MMHG | DIASTOLIC BLOOD PRESSURE: 76 MMHG

## 2022-08-09 DIAGNOSIS — Z98.891 HISTORY OF UTERINE SCAR FROM PREVIOUS SURGERY: Chronic | ICD-10-CM

## 2022-08-09 DIAGNOSIS — Z96.659 PRESENCE OF UNSPECIFIED ARTIFICIAL KNEE JOINT: Chronic | ICD-10-CM

## 2022-08-09 PROCEDURE — 73564 X-RAY EXAM KNEE 4 OR MORE: CPT

## 2022-08-09 PROCEDURE — 73564 X-RAY EXAM KNEE 4 OR MORE: CPT | Mod: 26,RT

## 2022-08-09 PROCEDURE — 99024 POSTOP FOLLOW-UP VISIT: CPT

## 2022-08-09 NOTE — HISTORY OF PRESENT ILLNESS
[de-identified] : Second post op right total knee replacement. Surgical date 6/30/22 [de-identified] : Doing well in OPT and doing HEP. Using cane. Minimal right knee pain, but complains of some R > L paraspinal low back pain.\par \par Traveling from now til November. [de-identified] : R knee:\par - Incision healed\par - ROM: 0-100\par - Ligaments stable\par - Quad strength 5/5\par - Mildly cautious right with cane [de-identified] : R knee XRs taken today demonstrate stable position of the components without evidence of mechanical complication. Patella sits at appropriate height and tracks centrally.  [de-identified] : 73y/o female about 6 wk s/p R TKA, doing well [de-identified] : Cont OPT/HEP\par She declined Pain/Spine referral for the LBP\par Tylenol as needed\par RTC in Nov with repeat right knee XRs

## 2022-08-14 ENCOUNTER — TRANSCRIPTION ENCOUNTER (OUTPATIENT)
Age: 72
End: 2022-08-14

## 2022-08-23 ENCOUNTER — RX RENEWAL (OUTPATIENT)
Age: 72
End: 2022-08-23

## 2022-08-23 RX ORDER — PANTOPRAZOLE 40 MG/1
40 TABLET, DELAYED RELEASE ORAL DAILY
Qty: 30 | Refills: 2 | Status: ACTIVE | COMMUNITY
Start: 2022-01-27 | End: 1900-01-01

## 2022-09-20 NOTE — REASON FOR VISIT
Reviewed PTA medication list with patient/caregiver and patient/caregiver denies any additional medications. Patient admits to having a responsible adult care for them at home for at least 24 hours after surgery. Patient encouraged to use gown warming system and informed that using said warming gown to regulate body temperature prior to a procedure has been shown to help reduce the risks of blood clots and infection. Patient's pharmacy of choice verified and documented in PTA medication section. Dual skin assessment & fall risk band verification completed with [employer]. [Initial Visit] : an initial visit for [Other: ____] : [unfilled]

## 2022-11-23 ENCOUNTER — RX RENEWAL (OUTPATIENT)
Age: 72
End: 2022-11-23

## 2022-11-30 ENCOUNTER — OUTPATIENT (OUTPATIENT)
Dept: OUTPATIENT SERVICES | Facility: HOSPITAL | Age: 72
LOS: 1 days | End: 2022-11-30
Payer: MEDICARE

## 2022-11-30 ENCOUNTER — APPOINTMENT (OUTPATIENT)
Dept: ORTHOPEDIC SURGERY | Facility: CLINIC | Age: 72
End: 2022-11-30

## 2022-11-30 ENCOUNTER — RESULT REVIEW (OUTPATIENT)
Age: 72
End: 2022-11-30

## 2022-11-30 VITALS
WEIGHT: 204 LBS | HEART RATE: 62 BPM | OXYGEN SATURATION: 96 % | BODY MASS INDEX: 42.82 KG/M2 | SYSTOLIC BLOOD PRESSURE: 111 MMHG | DIASTOLIC BLOOD PRESSURE: 71 MMHG | HEIGHT: 58 IN

## 2022-11-30 DIAGNOSIS — Z47.1 AFTERCARE FOLLOWING JOINT REPLACEMENT SURGERY: ICD-10-CM

## 2022-11-30 DIAGNOSIS — Z98.891 HISTORY OF UTERINE SCAR FROM PREVIOUS SURGERY: Chronic | ICD-10-CM

## 2022-11-30 DIAGNOSIS — Z96.659 PRESENCE OF UNSPECIFIED ARTIFICIAL KNEE JOINT: Chronic | ICD-10-CM

## 2022-11-30 DIAGNOSIS — Z96.651 AFTERCARE FOLLOWING JOINT REPLACEMENT SURGERY: ICD-10-CM

## 2022-11-30 PROCEDURE — 73564 X-RAY EXAM KNEE 4 OR MORE: CPT | Mod: 26,RT

## 2022-11-30 PROCEDURE — 73564 X-RAY EXAM KNEE 4 OR MORE: CPT

## 2022-11-30 PROCEDURE — 99213 OFFICE O/P EST LOW 20 MIN: CPT

## 2022-12-01 NOTE — END OF VISIT
[FreeTextEntry3] : All medical record entries made by the Scribe were at my, Dr. Robbin Koehler, direction and personally dictated by me on 11/30/2022. I have reviewed the chart and agree that the record accurately reflects my personal performance of the history, physical exam, assessment and plan. I have also personally directed, reviewed, and agreed with the chart.

## 2022-12-01 NOTE — ADDENDUM
[FreeTextEntry1] : Documented by Scarlet Martínez acting as a scribe for Dr. Robbin Koehler on 11/30/2022.

## 2022-12-01 NOTE — HISTORY OF PRESENT ILLNESS
[de-identified] : L TKA 1/28/22\par R TKA 6/30/22\par \par 11/30/2022: 73 y/o female f/u 5 months s/p R TKA performed on 6/30/22. She reports she is doing well. Her pain is well-controlled without medications. She is now finished with outpatient PT and is continuing with her HEP. She ambulates with or without a cane, carrying it more for personal protection than for any other reason. She has no new complaints today and is happy with her progress thus far. \par \par 5/24/22: Now nearly 4mo s/p L TKA, doing well. Following up to review progress on preparations for R TKA. Reports that she underwent a clearance stress test with normal results. Surgery scheduled for June. Would like to proceed as planned.\par \par 12/8/21: 70y/o Persian speaking female here with daughter Conchis, ref by Dr. Bunch for evaluation of bilateral knee osteoarthritis. Left is consistently more symptomatic than the right. Progressive pain, swelling, deformity, and loss of function for the past 6 years. Treatments attempted thus far include PT, HEP, activity modification, Tylenol, various NSAIDs, CSI, and PRP. Pain at this point is severe every day and limits her from ambulating any more than necessary for basic necessities. Lives with an adult child; has three steps to manage at home. Can walk about 5-10min with cane. The child does all the IADLs. Here to discuss TKA.\par \par PMH sig only for HLD.

## 2022-12-01 NOTE — PHYSICAL EXAM
[de-identified] :  General appearance: well nourished and hydrated, pleasant, alert and oriented x 3, cooperative.  \par HEENT: normocephalic, EOM intact, wearing mask, external auditory canal clear.  \par Cardiovascular: no lower leg edema, no varicosities, dorsalis pedis pulses palpable and symmetric.  \par Lymphatics: no palpable lymphadenopathy, no lymphedema.  \par Neurologic: sensation is normal, no muscle weakness in upper or lower extremities, patella tendon reflexes present and symmetric.  \par Dermatologic: skin moist, warm, no rash.  \par Spine: cervical spine with normal lordosis and painless range of motion, thoracic spine with normal kyphosis and painless range of motion, lumbosacral spine with normal lordosis and painless range of motion.  No tenderness to palpation along midline spine and paraspinal musculature.  Sacroiliac joints nontender bilaterally. Negative SLR and crossed SLR tests bilaterally.\par Gait: normal.  She demonstrates a stable nonantalgic gait pattern without use of an assistive device with normal stide length and rashard\par \par Right knee:\par - Focal soft tissue swelling: none\par - Ecchymosis: none\par - Erythema: none\par - Effusion: small residual, no Baker's cyst\par - Wounds: well healed surgical incision, benign appearing\par - Alignment: normal\par - Tenderness: mild TTP around medial and lateral joint lines\par - ROM: 0-130\par - Collateral laxity: none\par - Cruciate laxity: none\par - Popliteal angle (degrees): 10\par - Quad strength: 5/5 [de-identified] : 4 radiographic views were taken of the right knee which demonstrate a well fixed TKA in unchanged position as compared to previous imaging without evidence of mechanical complication. Patella sits at normal height and tracks centrally.

## 2022-12-01 NOTE — DISCUSSION/SUMMARY
[de-identified] : 71 y/o female approximately 5 months s/p right TKA doing very well. \par - She was encouraged to continue with HEP, wean the use of the cane over time, resume all normal activities as tolerated and f/u in June 2023 with repeat bilateral knee XR or earlier as needed.

## 2023-04-27 ENCOUNTER — APPOINTMENT (OUTPATIENT)
Dept: HEART AND VASCULAR | Facility: CLINIC | Age: 73
End: 2023-04-27

## 2024-05-29 NOTE — PATIENT PROFILE ADULT - NSFALLSECTIONLABEL_GEN_A_CORE
Add 52 Modifier (Optional): no
Show Applicator Variable?: Yes
Spray Paint Text: The liquid nitrogen was applied to the skin utilizing a spray paint frosting technique.
Medical Necessity Information: It is in your best interest to select a reason for this procedure from the list below. All of these items fulfill various CMS LCD requirements except the new and changing color options.
Consent: The patient's consent was obtained including but not limited to risks of crusting, scabbing, blistering, scarring, darker or lighter pigmentary change, recurrence, incomplete removal and infection.
Detail Level: Zone
Medical Necessity Clause: This procedure was medically necessary because the lesions that were treated were:
Post-Care Instructions: I reviewed with the patient in detail post-care instructions.  Pt may apply Vaseline to crusted or scabbing areas.\\nMupirocin antibiotic ointment may be applied if there are signs of infection.
Number Of Freeze-Thaw Cycles: 3 freeze-thaw cycles
.

## (undated) DEVICE — DRSG COBAN 6"

## (undated) DEVICE — PACK TOTAL KNEE

## (undated) DEVICE — WOUND IRR IRRISEPT W 0.5 CHG

## (undated) DEVICE — ELCTR AQUAMANTYS BIPOLAR SEALER 6.0

## (undated) DEVICE — SAW BLADE STRYKER SAGITTAL 81.5X12.5X1.19MM

## (undated) DEVICE — SYR ASEPTO

## (undated) DEVICE — ROSA NAVITRACKER KIT KNEE/SPINE

## (undated) DEVICE — SUT VICRYL 2-0 27" CT-1 UNDYED

## (undated) DEVICE — DRSG PREVENA PEEL & PLACE KIT 20CM

## (undated) DEVICE — SYR LUER LOK 50CC

## (undated) DEVICE — SAW BLADE STRYKER SAGITTAL 25X86.5X1.32MM

## (undated) DEVICE — DRAPE U POLY BLUE 60X72"

## (undated) DEVICE — NDL 18G BLUNT FILL PINK

## (undated) DEVICE — HOOD T5 PEELAWAY

## (undated) DEVICE — ELCTR BOVIE PENCIL HANDPIECE ROCKER SWITCH 15FT

## (undated) DEVICE — DRSG STOCKINETTE IMPERVIOUS XL

## (undated) DEVICE — DRAPE LIGHT HANDLE COVER (BLUE)

## (undated) DEVICE — SUCTION YANKAUER NO CONTROL VENT

## (undated) DEVICE — SUT VICRYL 0 36" CT-1 UNDYED

## (undated) DEVICE — SAW BLADE STRYKER SAGITTAL DUAL CUT TEETH 35X64X.64MM

## (undated) DEVICE — GLV 7.5 PROTEXIS (WHITE)

## (undated) DEVICE — VENODYNE/SCD SLEEVE CALF MEDIUM

## (undated) DEVICE — MARKING PEN W RULER

## (undated) DEVICE — DRAPE TOWEL BLUE 17" X 24"

## (undated) DEVICE — SUT STRATAFIX SPIRAL PDO 0 24CM CP-2

## (undated) DEVICE — ROSA DRAPE ROBOTIC UNIT

## (undated) DEVICE — POSITIONER FOAM EGG CRATE ULNAR 2PCS (PINK)

## (undated) DEVICE — STRYKER 4-PORT MANIFOLD W/SPECIMEN COLLECTION

## (undated) DEVICE — GLV 7 PROTEXIS (WHITE)

## (undated) DEVICE — SUT STRATAFIX SPIRAL PDO 1 30CM OS-6

## (undated) DEVICE — WARMING BLANKET UPPER ADULT

## (undated) DEVICE — POLISHER OR CAUTERY TIP

## (undated) DEVICE — PREP CHLORAPREP HI-LITE ORANGE 26ML

## (undated) DEVICE — NDL HYPO SAFE 22G X 1.5" (BLACK)

## (undated) DEVICE — SUT STRATAFIX SYMMETRIC PDS 1 45CM OS-6

## (undated) DEVICE — SUT STRATAFIX SPIRAL PGA-PCL 3-0 30CM FS-1

## (undated) DEVICE — SUT STRATAFIX SPIRAL MONOCRYL PLUS 3-0 30CM PS-1 UNDYED

## (undated) DEVICE — DRAPE 1/2 SHEET 40X57"